# Patient Record
Sex: MALE | Race: WHITE | Employment: OTHER | URBAN - NONMETROPOLITAN AREA
[De-identification: names, ages, dates, MRNs, and addresses within clinical notes are randomized per-mention and may not be internally consistent; named-entity substitution may affect disease eponyms.]

---

## 2017-08-28 ENCOUNTER — ANTI-COAG VISIT (OUTPATIENT)
Dept: PHARMACY | Facility: CLINIC | Age: 72
End: 2017-08-28

## 2017-08-28 DIAGNOSIS — I48.19 PERSISTENT ATRIAL FIBRILLATION (HCC): ICD-10-CM

## 2017-08-28 LAB — INR BLD: 2.9

## 2017-09-06 ENCOUNTER — ANTI-COAG VISIT (OUTPATIENT)
Dept: PHARMACY | Facility: CLINIC | Age: 72
End: 2017-09-06

## 2017-09-06 DIAGNOSIS — I48.19 PERSISTENT ATRIAL FIBRILLATION (HCC): ICD-10-CM

## 2017-09-06 LAB — INR BLD: 1.8

## 2017-09-06 RX ORDER — GABAPENTIN 600 MG/1
600 TABLET ORAL 3 TIMES DAILY
COMMUNITY
End: 2020-02-17 | Stop reason: CLARIF

## 2017-09-06 RX ORDER — LISINOPRIL 10 MG/1
5 TABLET ORAL DAILY
COMMUNITY

## 2017-09-06 RX ORDER — OMEPRAZOLE 20 MG/1
20 CAPSULE, DELAYED RELEASE ORAL DAILY
COMMUNITY

## 2017-09-06 RX ORDER — ASPIRIN 81 MG/1
81 TABLET ORAL DAILY
COMMUNITY

## 2017-09-06 RX ORDER — OXYBUTYNIN CHLORIDE 5 MG/1
5 TABLET ORAL 2 TIMES DAILY
COMMUNITY

## 2017-09-06 RX ORDER — WARFARIN SODIUM 5 MG/1
5 TABLET ORAL DAILY
COMMUNITY
End: 2022-01-24

## 2017-09-06 RX ORDER — ATORVASTATIN CALCIUM 40 MG/1
40 TABLET, FILM COATED ORAL DAILY
COMMUNITY

## 2017-09-20 ENCOUNTER — ANTI-COAG VISIT (OUTPATIENT)
Dept: PHARMACY | Facility: CLINIC | Age: 72
End: 2017-09-20

## 2017-09-20 DIAGNOSIS — I48.19 PERSISTENT ATRIAL FIBRILLATION (HCC): ICD-10-CM

## 2017-09-20 LAB — INR BLD: 2.1

## 2017-10-11 ENCOUNTER — ANTI-COAG VISIT (OUTPATIENT)
Dept: PHARMACY | Facility: CLINIC | Age: 72
End: 2017-10-11

## 2017-10-11 DIAGNOSIS — I48.19 PERSISTENT ATRIAL FIBRILLATION (HCC): ICD-10-CM

## 2017-10-11 LAB — INR BLD: 1.9

## 2017-10-11 NOTE — PROGRESS NOTES
MrRegis Patel is here for management of anticoagulation for Afib. PMH also significant for HTN, HLD, DM, Hx DVT. He presents today w/out complaint. Pt verifies dosing regimen as listed above. Pt denies s/s bleeding/bruising/swelling/SOB. No BRBPR. No melena. No missed doses  No changes in RX/OTCs/Herbal medications. Reviewed dietary concerns  Address EToH and tobacco use. He has previously been on warfarin for DVTs in the past. Was on Eliquis for a while but developed a rash so was switched back to warfarin. No changes to medications or diet. As INR has been at low end of range, will make slight dose increase today. He will be going to [facility] for a week in Nov.    INR 1.9 is slightly below therapeutic range of 2-3   Recommend to increase dose to 7.5 mg daily except 5 mg Tu/Th/Sat  Patient has 5 mg tablets. Will continue to monitor and check INR in 3 weeks. Dosing reminder card given with phone number, appointment date and time.    Return to clinic: 11/1/17 @ 9:45 AM    Nanette Rome PharmD 9:38 AM 10/11/17

## 2017-11-01 ENCOUNTER — ANTI-COAG VISIT (OUTPATIENT)
Dept: PHARMACY | Facility: CLINIC | Age: 72
End: 2017-11-01

## 2017-11-01 LAB — INR BLD: 3.7

## 2017-11-22 ENCOUNTER — ANTI-COAG VISIT (OUTPATIENT)
Dept: PHARMACY | Facility: CLINIC | Age: 72
End: 2017-11-22

## 2017-11-22 LAB — INR BLD: 4.6

## 2017-11-22 NOTE — PROGRESS NOTES
MrRegis Burden is here for management of anticoagulation for Afib. PMH also significant for HTN, HLD, DM, Hx DVT. He presents today w/out complaint. Pt verifies dosing regimen as listed above. Pt denies s/s bleeding/bruising/swelling/SOB. No BRBPR. No melena. No missed doses  No changes in RX/OTCs/Herbal medications. Reviewed dietary concerns  Denies EToH and tobacco use. He has previously been on warfarin for DVTs in the past. Was on Eliquis for a while but developed a rash so was switched back to warfarin. INR is even higher than last visit when dose was decreased. Patient just got back from trip to [facility]. Patient is going out of town again to [place], then leaving for [place] next Wednesday. Pt states he will stop and have INR checked on their way when they leave for [place]. INR 4.6 is above therapeutic range of 2-3   Recommend to HOLD x 2 doses, then decrease to 5 mg daily. Patient has 5 mg tablets. Will continue to monitor and check INR in 7 days. Dosing reminder card given with phone number, appointment date and time.    Return to clinic: 11/29 @ 9:45am

## 2017-11-29 ENCOUNTER — ANTI-COAG VISIT (OUTPATIENT)
Dept: PHARMACY | Facility: CLINIC | Age: 72
End: 2017-11-29

## 2017-11-29 LAB — INR BLD: 1.7

## 2017-11-29 NOTE — PROGRESS NOTES
Mr. Ignacio Brito is here for management of anticoagulation for Afib. PMH also significant for HTN, HLD, DM, Hx DVT. He presents today w/out complaint. Pt verifies dosing regimen as listed above. Pt denies s/s bleeding/bruising/swelling/SOB. No BRBPR. No melena. No missed doses  No changes in RX/OTCs/Herbal medications. Reviewed dietary concerns  Denies EToH and tobacco use. He has previously been on warfarin for DVTs in the past. Was on Eliquis for a while but developed a rash so was switched back to warfarin. INR finally back down after large dose decrease. Will make small dose increase to hopefully have INR within range at next appt. INR 1.7 is slightly below therapeutic range of 2-3   Recommend to increase to 5 mg daily, except 7.5 mg on Wed. Patient has 5 mg tablets. Will continue to monitor and check INR in 2 weeks. Dosing reminder card given with phone number, appointment date and time.    Return to clinic: 12/13 @ 11:00am

## 2017-12-13 ENCOUNTER — ANTI-COAG VISIT (OUTPATIENT)
Dept: PHARMACY | Facility: CLINIC | Age: 72
End: 2017-12-13

## 2017-12-13 LAB — INR BLD: 2.4

## 2017-12-13 NOTE — PROGRESS NOTES
Mr. Leatha Silva is here for management of anticoagulation for Afib. PMH also significant for HTN, HLD, DM, Hx DVT. He presents today w/out complaint. Pt verifies dosing regimen as listed above. Pt denies s/s bleeding/bruising/swelling/SOB. No BRBPR. No melena. No missed doses  No changes in RX/OTCs/Herbal medications. Reviewed dietary concerns  Denies EToH and tobacco use. He has previously been on warfarin for DVTs in the past. Was on Eliquis for a while but developed a rash so was switched back to warfarin. INR 2.4 is within acceptbale therapeutic range of 2-3   Recommend to continue 5 mg daily, except 7.5 mg on Wed. Patient has 5 mg tablets. Will continue to monitor and check INR in 4 weeks. Dosing reminder card given with phone number, appointment date and time.    Return to clinic: 1/10 @ 11:00am

## 2018-01-08 ENCOUNTER — ANTI-COAG VISIT (OUTPATIENT)
Dept: PHARMACY | Facility: CLINIC | Age: 73
End: 2018-01-08

## 2018-01-08 LAB — INR BLD: 2

## 2018-01-08 NOTE — PROGRESS NOTES
MrRegis Martinez is here for management of anticoagulation for Afib. PMH also significant for HTN, HLD, DM, Hx DVT. He presents today w/out complaint. Pt verifies dosing regimen as listed above. Pt denies s/s bleeding/bruising/swelling/SOB. No BRBPR. No melena. No missed doses  No changes in RX/OTCs/Herbal medications. Reviewed dietary concerns  Denies EToH and tobacco use. He has previously been on warfarin for DVTs in the past. Was on Eliquis for a while but developed a rash so was switched back to warfarin. INR 2.0 is within acceptbale therapeutic range of 2-3   Recommend to continue 5 mg daily, except 7.5 mg on Wed. Patient has 5 mg tablets. Will continue to monitor and check INR in 4 weeks. Dosing reminder card given with phone number, appointment date and time.    Return to clinic: 2/5 @ 11:00am

## 2018-02-05 ENCOUNTER — ANTI-COAG VISIT (OUTPATIENT)
Dept: PHARMACY | Facility: CLINIC | Age: 73
End: 2018-02-05

## 2018-02-05 LAB — INR BLD: 2

## 2018-03-05 ENCOUNTER — ANTI-COAG VISIT (OUTPATIENT)
Dept: PHARMACY | Facility: CLINIC | Age: 73
End: 2018-03-05

## 2018-03-05 LAB — INR BLD: 1.8

## 2018-04-02 ENCOUNTER — ANTI-COAG VISIT (OUTPATIENT)
Dept: PHARMACY | Facility: CLINIC | Age: 73
End: 2018-04-02

## 2018-04-02 LAB — INR BLD: 1.7

## 2018-04-16 ENCOUNTER — ANTI-COAG VISIT (OUTPATIENT)
Dept: PHARMACY | Facility: CLINIC | Age: 73
End: 2018-04-16

## 2018-04-16 LAB — INR BLD: 1.9

## 2018-05-14 ENCOUNTER — ANTI-COAG VISIT (OUTPATIENT)
Dept: PHARMACY | Facility: CLINIC | Age: 73
End: 2018-05-14

## 2018-05-14 DIAGNOSIS — I48.19 PERSISTENT ATRIAL FIBRILLATION (HCC): ICD-10-CM

## 2018-05-14 LAB — INR BLD: 3

## 2018-06-11 ENCOUNTER — ANTI-COAG VISIT (OUTPATIENT)
Dept: PHARMACY | Facility: CLINIC | Age: 73
End: 2018-06-11

## 2018-06-11 DIAGNOSIS — I48.19 PERSISTENT ATRIAL FIBRILLATION (HCC): ICD-10-CM

## 2018-06-11 LAB — INR BLD: 3.8

## 2018-07-02 ENCOUNTER — ANTI-COAG VISIT (OUTPATIENT)
Dept: PHARMACY | Facility: CLINIC | Age: 73
End: 2018-07-02

## 2018-07-02 DIAGNOSIS — I48.19 PERSISTENT ATRIAL FIBRILLATION (HCC): ICD-10-CM

## 2018-07-02 LAB — INR BLD: 5.6

## 2018-07-02 NOTE — PROGRESS NOTES
Mr. Jenkins Gravely is here for management of anticoagulation for Afib. PMH also significant for HTN, HLD, DM, Hx DVT. He presents today w/out complaint. Pt verifies dosing regimen as listed above. Pt denies s/s bleeding/bruising/swelling/SOB. No BRBPR. No melena. No missed doses  No changes in RX/OTCs/Herbal medications. Reviewed dietary concerns  Denies EToH and tobacco use. He has previously been on warfarin for DVTs in the past. Was on Eliquis for a while but developed a rash so was switched back to warfarin. INR high today despite dose decrease at last visit. Unclear why INR so high, pt states no changes in diet/meds. INR 5.6 is well above acceptable therapeutic range of 2-3   Recommend to hold dose today and tomorrow, then reduce to 5 mg daily, except 2.5 mg on Sun. Patient has 5 mg tablets. Will continue to monitor and check INR in 1 week. Dosing reminder card given with phone number, appointment date and time.    Return to clinic: 7/9 @ 11:00am (usually arrives very early)    Agustín Delgado, Genevieve 10:33 AM 7/2/18

## 2018-07-09 ENCOUNTER — ANTI-COAG VISIT (OUTPATIENT)
Dept: PHARMACY | Facility: CLINIC | Age: 73
End: 2018-07-09

## 2018-07-09 DIAGNOSIS — I48.19 PERSISTENT ATRIAL FIBRILLATION (HCC): ICD-10-CM

## 2018-07-09 LAB — INR BLD: 1.7

## 2018-07-23 ENCOUNTER — ANTI-COAG VISIT (OUTPATIENT)
Dept: PHARMACY | Age: 73
End: 2018-07-23
Payer: MEDICARE

## 2018-07-23 DIAGNOSIS — I48.19 PERSISTENT ATRIAL FIBRILLATION (HCC): ICD-10-CM

## 2018-07-23 LAB — INR BLD: 3.1

## 2018-07-23 PROCEDURE — 99211 OFF/OP EST MAY X REQ PHY/QHP: CPT | Performed by: PHARMACIST

## 2018-07-23 PROCEDURE — 85610 PROTHROMBIN TIME: CPT | Performed by: PHARMACIST

## 2018-07-23 NOTE — PROGRESS NOTES
Mr. Fletcher Niece is here for management of anticoagulation for Afib. PMH also significant for HTN, HLD, DM, Hx DVT. He presents today w/out complaint. Pt verifies dosing regimen as listed above. Pt denies s/s bleeding/bruising/swelling/SOB. No BRBPR. No melena. No missed doses  No changes in RX/OTCs/Herbal medications. Reviewed dietary concerns  Denies EToH and tobacco use. He has previously been on warfarin for DVTs in the past. Was on Eliquis for a while but developed a rash so was switched back to warfarin. INR 3.1 is slightly above acceptable therapeutic range of 2-3   Recommend to continue dose of 5 mg daily. Patient has 5 mg tablets. Will continue to monitor and check INR in 4 weeks. Dosing reminder card given with phone number, appointment date and time.    Return to clinic: 8/20 @ 11:00am (usually arrives very early)    Giselle Martinez PharmD 9:57 AM 7/23/18

## 2018-08-20 ENCOUNTER — ANTI-COAG VISIT (OUTPATIENT)
Dept: PHARMACY | Age: 73
End: 2018-08-20
Payer: MEDICARE

## 2018-08-20 DIAGNOSIS — I48.19 PERSISTENT ATRIAL FIBRILLATION (HCC): ICD-10-CM

## 2018-08-20 LAB — INR BLD: 3.9

## 2018-08-20 PROCEDURE — 85610 PROTHROMBIN TIME: CPT | Performed by: PHARMACIST

## 2018-08-20 PROCEDURE — 99211 OFF/OP EST MAY X REQ PHY/QHP: CPT | Performed by: PHARMACIST

## 2018-09-10 ENCOUNTER — HOSPITAL ENCOUNTER (OUTPATIENT)
Age: 73
Discharge: HOME | End: 2018-09-10
Payer: MEDICARE

## 2018-09-24 ENCOUNTER — ANTI-COAG VISIT (OUTPATIENT)
Dept: PHARMACY | Age: 73
End: 2018-09-24
Payer: MEDICARE

## 2018-09-24 DIAGNOSIS — I48.19 PERSISTENT ATRIAL FIBRILLATION (HCC): ICD-10-CM

## 2018-09-24 LAB — INTERNATIONAL NORMALIZATION RATIO, POC: 2.5

## 2018-09-24 PROCEDURE — 99211 OFF/OP EST MAY X REQ PHY/QHP: CPT | Performed by: PHARMACIST

## 2018-09-24 PROCEDURE — 85610 PROTHROMBIN TIME: CPT | Performed by: PHARMACIST

## 2018-09-24 NOTE — PROGRESS NOTES
Mr. [facility] is here for management of anticoagulation for Afib. PMH also significant for HTN, HLD, DM, Hx DVT. He presents today w/out complaint. Pt verifies dosing regimen as listed above. Pt denies s/s bleeding/bruising/swelling/SOB. No BRBPR. No melena. No missed doses  No changes in RX/OTCs/Herbal medications. Reviewed dietary concerns  Denies EToH and tobacco use. He has previously been on warfarin for DVTs in the past. Was on Eliquis for a while but developed a rash so was switched back to warfarin. Currently taking amoxicillin for URI. INR 2.5 is withinacceptable therapeutic range of 2-3   Recommend to continue 5 mg daily except 2.5 mg on Sun/Wed. Patient has 5 mg tablets. Will continue to monitor and check INR in 4 weeks. Dosing reminder card given with phone number, appointment date and time.    Return to clinic: 10/22 @ 11 am (usually comes around 10)    Joao FlahertyD 10:12 AM 9/24/18

## 2018-10-22 ENCOUNTER — ANTI-COAG VISIT (OUTPATIENT)
Dept: PHARMACY | Age: 73
End: 2018-10-22
Payer: MEDICARE

## 2018-10-22 DIAGNOSIS — I48.19 PERSISTENT ATRIAL FIBRILLATION (HCC): ICD-10-CM

## 2018-10-22 LAB — INTERNATIONAL NORMALIZATION RATIO, POC: 3.3

## 2018-10-22 PROCEDURE — 85610 PROTHROMBIN TIME: CPT | Performed by: PHARMACIST

## 2018-10-22 PROCEDURE — 99211 OFF/OP EST MAY X REQ PHY/QHP: CPT | Performed by: PHARMACIST

## 2018-11-19 ENCOUNTER — ANTI-COAG VISIT (OUTPATIENT)
Dept: PHARMACY | Age: 73
End: 2018-11-19
Payer: MEDICARE

## 2018-11-19 DIAGNOSIS — I48.19 PERSISTENT ATRIAL FIBRILLATION (HCC): ICD-10-CM

## 2018-11-19 LAB — INTERNATIONAL NORMALIZATION RATIO, POC: 3.7

## 2018-11-19 PROCEDURE — 99211 OFF/OP EST MAY X REQ PHY/QHP: CPT | Performed by: PHARMACIST

## 2018-11-19 PROCEDURE — 85610 PROTHROMBIN TIME: CPT | Performed by: PHARMACIST

## 2018-12-17 ENCOUNTER — ANTI-COAG VISIT (OUTPATIENT)
Dept: PHARMACY | Age: 73
End: 2018-12-17
Payer: MEDICARE

## 2018-12-17 DIAGNOSIS — I48.19 PERSISTENT ATRIAL FIBRILLATION (HCC): ICD-10-CM

## 2018-12-17 LAB — INTERNATIONAL NORMALIZATION RATIO, POC: 1.9

## 2018-12-17 PROCEDURE — 99211 OFF/OP EST MAY X REQ PHY/QHP: CPT | Performed by: PHARMACIST

## 2018-12-17 PROCEDURE — 85610 PROTHROMBIN TIME: CPT | Performed by: PHARMACIST

## 2019-01-14 ENCOUNTER — ANTI-COAG VISIT (OUTPATIENT)
Dept: PHARMACY | Age: 74
End: 2019-01-14
Payer: MEDICARE

## 2019-01-14 LAB — INTERNATIONAL NORMALIZATION RATIO, POC: 2.5

## 2019-01-14 PROCEDURE — 99211 OFF/OP EST MAY X REQ PHY/QHP: CPT

## 2019-01-14 PROCEDURE — 85610 PROTHROMBIN TIME: CPT

## 2019-02-11 ENCOUNTER — ANTI-COAG VISIT (OUTPATIENT)
Dept: PHARMACY | Age: 74
End: 2019-02-11
Payer: MEDICARE

## 2019-02-11 DIAGNOSIS — I48.19 PERSISTENT ATRIAL FIBRILLATION (HCC): ICD-10-CM

## 2019-02-11 LAB — INTERNATIONAL NORMALIZATION RATIO, POC: 2.3

## 2019-02-11 PROCEDURE — 85610 PROTHROMBIN TIME: CPT | Performed by: PHARMACIST

## 2019-02-11 PROCEDURE — 99211 OFF/OP EST MAY X REQ PHY/QHP: CPT | Performed by: PHARMACIST

## 2019-03-11 ENCOUNTER — ANTI-COAG VISIT (OUTPATIENT)
Dept: PHARMACY | Age: 74
End: 2019-03-11
Payer: MEDICARE

## 2019-03-11 DIAGNOSIS — I48.19 PERSISTENT ATRIAL FIBRILLATION (HCC): ICD-10-CM

## 2019-03-11 LAB — INTERNATIONAL NORMALIZATION RATIO, POC: 2.1

## 2019-03-11 PROCEDURE — 99211 OFF/OP EST MAY X REQ PHY/QHP: CPT | Performed by: PHARMACIST

## 2019-03-11 PROCEDURE — 85610 PROTHROMBIN TIME: CPT | Performed by: PHARMACIST

## 2019-04-08 ENCOUNTER — ANTI-COAG VISIT (OUTPATIENT)
Dept: PHARMACY | Age: 74
End: 2019-04-08
Payer: MEDICARE

## 2019-04-08 DIAGNOSIS — I48.19 PERSISTENT ATRIAL FIBRILLATION (HCC): ICD-10-CM

## 2019-04-08 LAB — INTERNATIONAL NORMALIZATION RATIO, POC: 1.4

## 2019-04-08 PROCEDURE — 99211 OFF/OP EST MAY X REQ PHY/QHP: CPT | Performed by: PHARMACIST

## 2019-04-08 PROCEDURE — 85610 PROTHROMBIN TIME: CPT | Performed by: PHARMACIST

## 2019-05-06 ENCOUNTER — ANTI-COAG VISIT (OUTPATIENT)
Dept: PHARMACY | Age: 74
End: 2019-05-06
Payer: MEDICARE

## 2019-05-06 DIAGNOSIS — I48.19 PERSISTENT ATRIAL FIBRILLATION (HCC): ICD-10-CM

## 2019-05-06 LAB — INTERNATIONAL NORMALIZATION RATIO, POC: 2

## 2019-05-06 PROCEDURE — 99211 OFF/OP EST MAY X REQ PHY/QHP: CPT | Performed by: PHARMACIST

## 2019-05-06 PROCEDURE — 85610 PROTHROMBIN TIME: CPT | Performed by: PHARMACIST

## 2019-05-06 NOTE — PROGRESS NOTES
Mr. Eliodoro Felty is here for management of anticoagulation for Afib. PMH also significant for HTN, HLD, DM, Hx DVT. He presents today w/out complaint. Pt verifies dosing regimen as listed above. Pt denies s/s bleeding/bruising/swelling/SOB. No BRBPR. No melena. No changes in RX/OTCs/Herbal medications. Reviewed dietary concerns  Denies EToH and tobacco use. He has previously been on warfarin for DVTs in the past. Was on Eliquis for a while but developed a rash so was switched back to warfarin. INR improved today after dose increase last visit. INR 2.0 is within aceptable therapeutic range of 2-3. Recommend to continue dose to 5 mg daily except 2.5 mg on Sun/Wed  Patient has 5 mg tablets. Will continue to monitor and check INR in 4 weeks. Dosing reminder card given with phone number, appointment date and time.    Return to clinic: 6/3 @ 11 am (usually comes around 10)    Don Salazar PharmD 9:41 AM 5/6/19

## 2019-06-03 ENCOUNTER — ANTI-COAG VISIT (OUTPATIENT)
Dept: PHARMACY | Age: 74
End: 2019-06-03
Payer: MEDICARE

## 2019-06-03 DIAGNOSIS — I48.19 PERSISTENT ATRIAL FIBRILLATION (HCC): ICD-10-CM

## 2019-06-03 LAB — INTERNATIONAL NORMALIZATION RATIO, POC: 2.4

## 2019-06-03 PROCEDURE — 85610 PROTHROMBIN TIME: CPT | Performed by: PHARMACIST

## 2019-06-03 PROCEDURE — 99211 OFF/OP EST MAY X REQ PHY/QHP: CPT | Performed by: PHARMACIST

## 2019-06-03 NOTE — PROGRESS NOTES
Mr. Marco Antonio Perkins is here for management of anticoagulation for Afib. PMH also significant for HTN, HLD, DM, Hx DVT. He presents today w/out complaint. Pt verifies dosing regimen as listed above. Pt denies s/s bleeding/bruising/swelling/SOB. No BRBPR. No melena. No changes in RX/OTCs/Herbal medications. Reviewed dietary concerns  Denies EToH and tobacco use. He has previously been on warfarin for DVTs in the past. Was on Eliquis for a while but developed a rash so was switched back to warfarin. INR 2.4 is within aceptable therapeutic range of 2-3. Recommend to continue dose to 5 mg daily except 2.5 mg on Sun/Wed  Patient has 5 mg tablets. Will continue to monitor and check INR in 4 weeks. Dosing reminder card given with phone number, appointment date and time.    Return to clinic: 7/1 @ 11 am (usually comes around 10)    Brayden Potts PharmD 9:36 AM 6/3/19

## 2019-07-01 ENCOUNTER — ANTI-COAG VISIT (OUTPATIENT)
Dept: PHARMACY | Age: 74
End: 2019-07-01
Payer: MEDICARE

## 2019-07-01 DIAGNOSIS — I48.19 PERSISTENT ATRIAL FIBRILLATION (HCC): ICD-10-CM

## 2019-07-01 LAB — INTERNATIONAL NORMALIZATION RATIO, POC: 1.3

## 2019-07-01 PROCEDURE — 99211 OFF/OP EST MAY X REQ PHY/QHP: CPT | Performed by: PHARMACIST

## 2019-07-01 PROCEDURE — 85610 PROTHROMBIN TIME: CPT | Performed by: PHARMACIST

## 2019-07-01 NOTE — PROGRESS NOTES
Mr. Montrell Harp is here for management of anticoagulation for Afib. PMH also significant for HTN, HLD, DM, Hx DVT. He presents today w/out complaint. Pt verifies dosing regimen as listed above. Pt denies s/s bleeding/bruising/swelling/SOB. No BRBPR. No melena. No changes in RX/OTCs/Herbal medications. Reviewed dietary concerns  Denies EToH and tobacco use. He has previously been on warfarin for DVTs in the past. Was on Eliquis for a while but developed a rash so was switched back to warfarin. Having a colonoscopy on 7/31. Will hold warfarin for 4 days prior. INR quite low today. Denies any missed doses or other changes. Unclear why INR is so low today. Will boost today then resume current dose as he has otherwise been pretty stable. INR 1.3 is below aceptable therapeutic range of 2-3. Recommend to take 10 mg today, then continue dose to 5 mg daily except 2.5 mg on Sun/Wed  Patient has 5 mg tablets. Will continue to monitor and check INR in 3 weeks. Dosing reminder card given with phone number, appointment date and time.    Return to clinic: 7/22 @ 11 am (usually comes around 10)    Bob Qiu PharmD 10:15 AM 7/1/19

## 2019-07-22 ENCOUNTER — ANTI-COAG VISIT (OUTPATIENT)
Dept: PHARMACY | Age: 74
End: 2019-07-22
Payer: MEDICARE

## 2019-07-22 DIAGNOSIS — I48.19 PERSISTENT ATRIAL FIBRILLATION (HCC): ICD-10-CM

## 2019-07-22 LAB — INTERNATIONAL NORMALIZATION RATIO, POC: 1.3

## 2019-07-22 PROCEDURE — 85610 PROTHROMBIN TIME: CPT | Performed by: FAMILY MEDICINE

## 2019-07-22 PROCEDURE — 99211 OFF/OP EST MAY X REQ PHY/QHP: CPT | Performed by: FAMILY MEDICINE

## 2019-07-22 NOTE — PROGRESS NOTES
MrRegis Hooper Both is here for management of anticoagulation for Afib. PMH also significant for HTN, HLD, DM, Hx DVT. He presents today w/out complaint. Pt verifies dosing regimen as listed above. Pt denies s/s bleeding/bruising/swelling/SOB. No BRBPR. No melena. No changes in RX/OTCs/Herbal medications. Reviewed dietary concerns  Denies EToH and tobacco use. He has previously been on warfarin for DVTs in the past. Was on Eliquis for a while but developed a rash so was switched back to warfarin. Having a colonoscopy on 7/31. Will hold warfarin for 4 days prior. INR is low again today. Patient denies any changes or missed doses. Will increase his dose. Will bring patient back 1 week after his procedure. INR 1.3 is below aceptable therapeutic range of 2-3. Recommend to take 7.5 mg today, then increase dose to 5 mg daily except 2.5 mg on Sun. Patient has 5 mg tablets. Will continue to monitor and check INR in 2 weeks. Dosing reminder card given with phone number, appointment date and time.    Return to clinic: 8/7 @ 11 am (usually comes around 10)    Fanny Briscoe, PharmD 7/22/2019 10:04 AM

## 2019-08-07 ENCOUNTER — ANTI-COAG VISIT (OUTPATIENT)
Dept: PHARMACY | Age: 74
End: 2019-08-07
Payer: MEDICARE

## 2019-08-07 DIAGNOSIS — I48.19 PERSISTENT ATRIAL FIBRILLATION (HCC): ICD-10-CM

## 2019-08-07 LAB — INTERNATIONAL NORMALIZATION RATIO, POC: 1.3

## 2019-08-07 PROCEDURE — 85610 PROTHROMBIN TIME: CPT | Performed by: PHARMACIST

## 2019-08-07 PROCEDURE — 99211 OFF/OP EST MAY X REQ PHY/QHP: CPT | Performed by: PHARMACIST

## 2019-08-07 NOTE — PROGRESS NOTES
Mr. Stanley Arrington is here for management of anticoagulation for Afib. PMH also significant for HTN, HLD, DM, Hx DVT. He presents today w/out complaint. Pt verifies dosing regimen as listed above. Pt denies s/s bleeding/bruising/swelling/SOB. No BRBPR. No melena. No changes in RX/OTCs/Herbal medications. Reviewed dietary concerns  Denies EToH and tobacco use. He has previously been on warfarin for DVTs in the past. Was on Eliquis for a while but developed a rash so was switched back to warfarin. Had a colonoscopy on 7/31. Restarted warfarin that evening. INR is low again today. Patient denies any changes or missed doses. Will increase dose again    INR 1.3 is below aceptable therapeutic range of 2-3. Recommend to increase dose to 5 mg daily except 7.5 mg on Wed. Patient has 5 mg tablets. Will continue to monitor and check INR in 2 weeks. Dosing reminder card given with phone number, appointment date and time.    Return to clinic: 8/19 @ 11 am (usually comes around 10)    Mary Fontenot PharmD 9:52 AM 8/7/19

## 2019-08-19 ENCOUNTER — ANTI-COAG VISIT (OUTPATIENT)
Dept: PHARMACY | Age: 74
End: 2019-08-19
Payer: MEDICARE

## 2019-08-19 DIAGNOSIS — I48.19 PERSISTENT ATRIAL FIBRILLATION (HCC): ICD-10-CM

## 2019-08-19 LAB — INTERNATIONAL NORMALIZATION RATIO, POC: 2.1

## 2019-08-19 PROCEDURE — 99211 OFF/OP EST MAY X REQ PHY/QHP: CPT | Performed by: PHARMACIST

## 2019-08-19 PROCEDURE — 85610 PROTHROMBIN TIME: CPT | Performed by: PHARMACIST

## 2019-09-18 ENCOUNTER — ANTI-COAG VISIT (OUTPATIENT)
Dept: PHARMACY | Age: 74
End: 2019-09-18
Payer: MEDICARE

## 2019-09-18 DIAGNOSIS — I48.19 PERSISTENT ATRIAL FIBRILLATION (HCC): ICD-10-CM

## 2019-09-18 LAB — INTERNATIONAL NORMALIZATION RATIO, POC: 1.9

## 2019-09-18 PROCEDURE — 85610 PROTHROMBIN TIME: CPT | Performed by: PHARMACIST

## 2019-09-18 PROCEDURE — 99211 OFF/OP EST MAY X REQ PHY/QHP: CPT | Performed by: PHARMACIST

## 2019-09-18 NOTE — PROGRESS NOTES
MrRegis Go is here for management of anticoagulation for Afib. PMH also significant for HTN, HLD, DM, Hx DVT. He presents today w/out complaint. Pt verifies dosing regimen as listed above. Pt denies s/s bleeding/bruising/swelling/SOB. No BRBPR. No melena. No changes in RX/OTCs/Herbal medications. Reviewed dietary concerns  Denies EToH and tobacco use. He has previously been on warfarin for DVTs in the past. Was on Eliquis for a while but developed a rash so was switched back to warfarin. INR back in range today after multiple dose increases. INR 1.9 is within aceptable therapeutic range of 2-3. Recommend to continue dose of 5 mg daily except 7.5 mg on Wed. Patient has 5 mg tablets. Will continue to monitor and check INR in 4 weeks. Dosing reminder card given with phone number, appointment date and time. Return to clinic: 10/14 @ 11:00 am    Irma Rapp PharmD candidate    I have seen the patient and reviewed the progress note written by the PharmD Candidate. I agree with this assessment and plan.    Otoniel Pryor PharmD 9/18/2019 9:56 AM

## 2019-10-14 ENCOUNTER — ANTI-COAG VISIT (OUTPATIENT)
Dept: PHARMACY | Age: 74
End: 2019-10-14
Payer: MEDICARE

## 2019-10-14 DIAGNOSIS — I48.19 PERSISTENT ATRIAL FIBRILLATION (HCC): ICD-10-CM

## 2019-10-14 LAB — INTERNATIONAL NORMALIZATION RATIO, POC: 3.6

## 2019-10-14 PROCEDURE — 99211 OFF/OP EST MAY X REQ PHY/QHP: CPT | Performed by: PHARMACIST

## 2019-10-14 PROCEDURE — 85610 PROTHROMBIN TIME: CPT | Performed by: PHARMACIST

## 2019-11-11 ENCOUNTER — ANTI-COAG VISIT (OUTPATIENT)
Dept: PHARMACY | Age: 74
End: 2019-11-11
Payer: MEDICARE

## 2019-11-11 DIAGNOSIS — I48.19 PERSISTENT ATRIAL FIBRILLATION (HCC): ICD-10-CM

## 2019-11-11 LAB — INTERNATIONAL NORMALIZATION RATIO, POC: 2.2

## 2019-11-11 PROCEDURE — 99211 OFF/OP EST MAY X REQ PHY/QHP: CPT

## 2019-11-11 PROCEDURE — 85610 PROTHROMBIN TIME: CPT

## 2020-01-06 ENCOUNTER — HOSPITAL ENCOUNTER (OUTPATIENT)
Dept: GENERAL RADIOLOGY | Age: 75
Discharge: HOME | End: 2020-01-06
Payer: MEDICARE

## 2020-01-06 ENCOUNTER — HOSPITAL ENCOUNTER (OUTPATIENT)
Age: 75
Discharge: HOME | End: 2020-01-06
Payer: MEDICARE

## 2020-01-06 PROCEDURE — 73502 X-RAY EXAM HIP UNI 2-3 VIEWS: CPT

## 2020-01-27 ENCOUNTER — OFFICE VISIT (OUTPATIENT)
Dept: ORTHOPEDIC SURGERY | Age: 75
End: 2020-01-27
Payer: MEDICARE

## 2020-01-27 VITALS — WEIGHT: 200 LBS | HEIGHT: 69 IN | BODY MASS INDEX: 29.62 KG/M2

## 2020-01-27 PROCEDURE — G8484 FLU IMMUNIZE NO ADMIN: HCPCS | Performed by: ORTHOPAEDIC SURGERY

## 2020-01-27 PROCEDURE — 99202 OFFICE O/P NEW SF 15 MIN: CPT | Performed by: ORTHOPAEDIC SURGERY

## 2020-01-27 PROCEDURE — G8427 DOCREV CUR MEDS BY ELIG CLIN: HCPCS | Performed by: ORTHOPAEDIC SURGERY

## 2020-01-27 PROCEDURE — G8417 CALC BMI ABV UP PARAM F/U: HCPCS | Performed by: ORTHOPAEDIC SURGERY

## 2020-01-27 RX ORDER — METHYLPREDNISOLONE 4 MG/1
TABLET ORAL
Qty: 1 KIT | Refills: 0 | Status: SHIPPED | OUTPATIENT
Start: 2020-01-27 | End: 2020-02-02

## 2020-01-27 NOTE — PROGRESS NOTES
connections:     Talks on phone: Not on file     Gets together: Not on file     Attends Alevism service: Not on file     Active member of club or organization: Not on file     Attends meetings of clubs or organizations: Not on file     Relationship status: Not on file    Intimate partner violence:     Fear of current or ex partner: Not on file     Emotionally abused: Not on file     Physically abused: Not on file     Forced sexual activity: Not on file   Other Topics Concern    Not on file   Social History Narrative    Not on file       FAMILY HISTORY    No family history on file. PHYSICAL EXAM    Ht 5' 9\" (1.753 m)   Wt 200 lb (90.7 kg)   BMI 29.53 kg/m² [email or web address]   General:  Patient is alert and orientation to person place and time is age-appropriate. Gait:  Patient walks around the room and in the hallway with a normal gait and no limp. Balance and coordination are age-appropriate. Extremities: On examination he has significant flexion in the standing position at the hips, some hyperlordosis of the spine, pain with lumbar extension and rotation. He does have some tenderness at the trochanter although this extends down the iliotibial band to the lateral calf. He is able to dorsiflex strength perhaps 4 out of 5. Negative Angi. Is a very mild pain with internal and external rotation of the hip. Skin:  Normal on back and bilateral upper and lower extremities. Spine:  No deformity. Neurological:  Normal motor and sensory exam in both upper and lower extremities. Vascular exam:  Normal in both upper and lower extremities. IMAGING STUDIES    X-rays available for review during the office include films of the hip. These demonstrate moderate osteoarthritis of the hip, some preserved joint space but significant marginal osteophyte development. More notable on the AP pelvis view is the severe lower lumbar degenerative deformity.        Office Procedures:      IMPRESSION    Clinical

## 2020-01-29 ENCOUNTER — HOSPITAL ENCOUNTER (OUTPATIENT)
Dept: PHYSICAL THERAPY | Age: 75
Setting detail: THERAPIES SERIES
Discharge: HOME | End: 2020-01-29
Payer: MEDICARE

## 2020-01-29 PROCEDURE — 97140 MANUAL THERAPY 1/> REGIONS: CPT

## 2020-01-29 PROCEDURE — 97110 THERAPEUTIC EXERCISES: CPT

## 2020-01-29 PROCEDURE — 97161 PT EVAL LOW COMPLEX 20 MIN: CPT

## 2020-01-29 NOTE — PLAN OF CARE
hasn't had any relief yet. The pain goes down the side of the R thigh and is in the front of the calf and foot.     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    Living Status/Prior Level of Function: Independent with ADLs and IADLs,     OBJECTIVE:     Standing Exam Normal Abnormal N/A Comments   Toe walk    A  Unable    Heel Walk  A  Unable    Side bending  A  R inc leg pain   Pelvic Height       Fwd Bend- (aberrant juttering or innominate mvmt)  A  Inc leg pain   Extension  A  Inc back pain   Trendelenburg  A  R trunk lean during stance   Kemps/Quadrant       Stork       SLS/SLS w rotation                     Seated Exam Normal Abnormal N/A Comments   Pelvic Height       Seated Rotation       Seated flexion       B hip IR  A                   Supine Exam Normal Abnormal N/A Comments   Hip flexion       Abduction       ER  A     IR       MILAN/Kumar                ROM LEFT RIGHT Comments   Lumbar Flex 80  Inc leg pain   Lumbar Ext 20  Inc back pain   Side Bend      Rotation                    ROM LEFT RIGHT Comments   Hip Flexion      Hip Abd      Hip ER      Hip IR      Hip Extension      Knee Ext      Knee Flex      Hamstring Flex      Piriformis                    Strength LEFT RIGHT Comments   Multifidus      Transverse Ab 2/5     Hip Flexors      Hip Abductors 3/5 2-/5    Hip Extensors                     Myotomes Normal Abnormal Comments   Hip flexion (L1-L2) N     Knee extension (L2-L4)  A    Dorsiflexion (L4-L5)  A    Great conditions   []Disc pathology   []Congenital spine pathologies   []Prior surgical intervention  []Osteoporosis (M81.8)  []Osteopenia (M85.8)   Endocrine conditions   []Hypothyroid (E03.9)  []Hyperthyroid Gastrointestinal conditions   []Constipation (K51.53)   Metabolic conditions   []Morbid obesity (E66.01)  [x]Diabetes type 1(E10.65) or 2 (E11.65)   []Neuropathy (G60.9)     Pulmonary conditions   []Asthma (J45)  []Coughing   []COPD (J44.9)   Psychological Disorders  []Anxiety (F41.9)  []Depression (F32.9)   []Other:   [x]Other:  H/o heart problems, stroke/TIA, cancer        Barriers to/and or personal factors that will affect rehab potential:              []Age  []Sex              []Motivation/Lack of Motivation                        [x]Co-Morbidities              []Cognitive Function, education/learning barriers              []Environmental, home barriers              []profession/work barriers  []past PT/medical experience  []other:  Justification:  Patient medically complex; not a good surgical candidate    Falls Risk Assessment (30 days):   [x] Falls Risk assessed and no intervention required.   [] Falls Risk assessed and Patient requires intervention due to being higher risk   TUG score (>12s at risk):     [] Falls education provided, including       G-Codes:       ASSESSMENT:   Functional Impairments:     []Noted lumbar/proximal hip hypomobility   []Noted lumbosacral and/or generalized hypermobility   [x]Decreased Lumbosacral/hip/LE functional ROM   [x]Decreased core/proximal hip strength and neuromuscular control    [x]Decreased LE functional strength    [x]Abnormal reflexes/sensation/myotomal/dermatomal deficits  [x]Reduced balance/proprioceptive control    []other:      Functional Activity Limitations (from functional questionnaire and intake)   [x]Reduced ability to tolerate prolonged functional positions   [x]Reduced ability or difficulty with changes of positions or transfers between positions   [x]Reduced ability to maintain good posture and demonstrate good body mechanics with sitting, bending, and lifting   [x]Reduced ability to sleep   [x] Reduced ability or tolerance with driving and/or computer work   [x]Reduced ability to perform lifting, reaching, carrying tasks   [x]Reduced ability to squat   [x]Reduced ability to forward bend   [x]Reduced ability to ambulate prolonged functional periods/distances/surfaces   [x]Reduced ability to ascend/descend stairs   []other:       Participation Restrictions   [x]Reduced participation in self care activities   [x]Reduced participation in home management activities   []Reduced participation in work activities   [x]Reduced participation in social activities. [x]Reduced participation in sport/recreational activities. Classification:   [x]Signs/symptoms consistent with Lumbar instability/stabilization subgroup. []Signs/symptoms consistent with Lumbar mobilization/manipulation subgroup, myotomes and dermatomes intact. Meets manipulation criteria. []Signs/symptoms consistent with Lumbar direction specific/centralization subgroup   []Signs/symptoms consistent with Lumbar traction subgroup     []Signs/symptoms consistent with lumbar facet dysfunction   []Signs/symptoms consistent with lumbar stenosis type dysfunction   [x]Signs/symptoms consistent with nerve root involvement including myotome & dermatome dysfunction   []Signs/symptoms consistent with post-surgical status including: decreased ROM, strength and function.    []signs/symptoms consistent with pathology which may benefit from Dry needling     []other:      Prognosis/Rehab Potential:      []Excellent   [x]Good    []Fair   []Poor    Tolerance of evaluation/treatment:    []Excellent   [x]Good    []Fair   []Poor    Physical Therapy Evaluation Complexity Justification   [x] A history of present problem with:  [] no personal factors and/or comorbidities that impact the plan of care;  []1-2 [] Not Met: [] Adjusted   2. Patient will have a decrease in pain to facilitate improvement in movement, function, and ADLs as indicated by Functional Deficits. [] Progressing: [] Met: [] Not Met: [] Adjusted     Long Term Goals: To be achieved in: 12 weeks  1. Disability index score of 20% or less for the LEFS/Oswestry to assist with reaching prior level of function. [] Progressing: [] Met: [] Not Met: [] Adjusted   2. Patient will demonstrate increased AROM to equal the opposite side bilaterally to allow for proper joint functioning as indicated by patients Functional Deficits. [] Progressing: [] Met: [] Not Met: [] Adjusted   3. Patient will demonstrate an increase in strength to 5/5 to allow for proper functional mobility as indicated by patients Functional Deficits. [] Progressing: [] Met: [] Not Met: [] Adjusted   4. Patient will return to all transfers, work activities, and functional activities without increased symptoms or restriction. [] Progressing: [] Met: [] Not Met: [] Adjusted   5. Patient will have 0/10 pain with ADL's.  [] Progressing: [] Met: [] Not Met: [] Adjusted   6. Patient stated goal: Patient wants to be able to walk x 20 minutes without AD, pain, or restriction.   [] Progressing: [] Met: [] Not Met: [] Adjusted     Electronically signed by:  Solange Melendrez PT

## 2020-01-29 NOTE — FLOWSHEET NOTE
[facility], [address] [place] [address] Marisa Finch, [facility] hospital    Physical Therapy Treatment Note/ Progress Report:     Date:  2020    Patient Name:  Joyce Hernandez    :  1945  MRN: [id number]  Restrictions/Precautions:    Medical/Treatment Diagnosis Information:  · Diagnosis: Low back pain with R radiculopathy  · Treatment Diagnosis: M54.16, C66.5  Insurance/Certification information:  PT Insurance Information: Medicare  Physician Information:  Referring Practitioner: Indu Culver  Has the plan of care been signed (Y/N):        []  Yes  [x]  No     Date of Patient follow up with Physician: 20 with Duane Laboy    Is this a Progress Report:     []  Yes  [x]  No      If Yes:  Date Range for reporting period:  Initial Eval: 2020  Beginnin2020 --- Endin20    Progress report will be due (10 Rx or 30 days whichever is less): 3/56/52     Recertification will be due (POC Duration  / 90 days whichever is less): 20     Visit # Insurance Allowable Auth Required   1 Med Nec []  Yes []  No      Functional Scale:  Mod Oswestry: 46% (Score: 23/80)   Date assessed: 2020      Latex Allergy:  [x]NO      []YES  Preferred Language for Healthcare:   [x]English       []other:    Pain level:  9/10     SUBJECTIVE:  See eval    OBJECTIVE: See eval   Observation:    Test measurements:      RESTRICTIONS/PRECAUTIONS: h/o diabetes, cancer, heart problems    Exercises/Interventions:   Therapeutic Ex (94515)  Therapeutic Activity (89244)  NMR re-education (70479) Sets/Reps Notes/CUES   Bike          PPT attempt Pain in all supine positions   Supine knee to chest attempt HEP        Seated flexion 10x10\"    Seated HS stretch 3x30\" ea    Seated rotation 20x                                                                                    Manual Intervention (51597)     Sidelying opening/light joint mobility 10' With some soft tissue mobilization Patient Education         Therapeutic Exercise and NMR EXR  [x] (96660) Provided verbal/tactile cueing for activities related to strengthening, flexibility, endurance, ROM for improvements in LE, proximal hip, and core control with self care, mobility, lifting, ambulation. [x] (45452) Provided verbal/tactile cueing for activities related to improving balance, coordination, kinesthetic sense, posture, motor skill, proprioception to assist with LE, proximal hip, and core control in self-care, mobility, lifting, ambulation and eccentric single leg control. NMR and Therapeutic Activities:    [x] (41606 or 82351) Provided verbal/tactile cueing for activities related to improving balance, coordination, kinesthetic sense, posture, motor skill, proprioception and motor activation to allow for proper function of core, proximal hip and LE with self-care and ADLs and functional mobility.    [x] (87627) Gait Re-education- Provided training and instruction to the patient for proper LE, core and proximal hip recruitment and positioning and eccentric body weight control with ambulation re-education including up and down stairs     Home Exercise Program:    [x] (52881) Reviewed/Progressed HEP activities related to strengthening, flexibility, endurance, ROM of core, proximal hip and LE for functional self-care, mobility, lifting and ambulation/stair navigation   [x] (99829) Reviewed/Progressed HEP activities related to improving balance, coordination, kinesthetic sense, posture, motor skill, proprioception of core, proximal hip and LE for self-care, mobility, lifting, and ambulation/stair navigation      Manual Treatments:  PROM / STM / Oscillations-Mobs:  G-I, II, III, IV (PA's, Inf., Post.)  [x] (92222) Provided manual therapy to mobilize LE, proximal hip and/or LS spine soft tissue/joints for the purpose of modulating pain, promoting relaxation, increasing ROM, reducing/eliminating soft tissue swelling/inflammation/restriction, improving soft tissue extensibility and allowing for proper ROM for normal function with self-care, mobility, lifting and ambulation. Modalities:  Moist heat x 10 min    Charges:  Timed Code Treatment Minutes: 30   Total Treatment Minutes:  60   BWC:  TE TIME:  NMR TIME:  MANUAL TIME:  UNTIMED MINUTES:   n/a  n/a  n/a  n/a      [x] EVAL (LOW) 36076 (typically 20 minutes face-to-face)  [] EVAL (MOD) 61661 (typically 30 minutes face-to-face)  [] EVAL (HIGH) 21066 (typically 45 minutes face-to-face)  [] RE-EVAL     [x] HU(21037) x  1   [] IONTO  [] NMR (10218) x     [] VASO  [x] Manual (17208) x 1    [] Other:  [] TA x      [] Mech Traction (56711)  [] ES(attended) (67638)      [] ES (un) (09201):    ASSESSMENT:  See eval    GOALS:   Short Term Goals: To be achieved in: 2 weeks  1. Independent in HEP and progression per patient tolerance, in order to prevent re-injury. []? Progressing: []? Met: []? Not Met: []? Adjusted       2. Patient will have a decrease in pain to facilitate improvement in movement, function, and ADLs as indicated by Functional Deficits. []? Progressing: []? Met: []? Not Met: []? Adjusted          Long Term Goals: To be achieved in: 12 weeks  1. Disability index score of 20% or less for the LEFS/Oswestry to assist with reaching prior level of function. []? Progressing: []? Met: []? Not Met: []? Adjusted      2. Patient will demonstrate increased AROM to equal the opposite side bilaterally to allow for proper joint functioning as indicated by patients Functional Deficits. []? Progressing: []? Met: []? Not Met: []? Adjusted       3. Patient will demonstrate an increase in strength to 5/5 to allow for proper functional mobility as indicated by patients Functional Deficits. []? Progressing: []? Met: []? Not Met: []? Adjusted       4. Patient will return to all transfers, work activities, and functional activities without increased symptoms or restriction.

## 2020-02-03 ENCOUNTER — HOSPITAL ENCOUNTER (OUTPATIENT)
Dept: PHYSICAL THERAPY | Age: 75
Setting detail: THERAPIES SERIES
Discharge: HOME | End: 2020-02-03
Payer: MEDICARE

## 2020-02-03 PROCEDURE — 97110 THERAPEUTIC EXERCISES: CPT

## 2020-02-03 PROCEDURE — 97140 MANUAL THERAPY 1/> REGIONS: CPT

## 2020-02-03 NOTE — FLOWSHEET NOTE
[facility], [address] [facility] Marisa Finch, [address]    Physical Therapy Treatment Note/ Progress Report:     Date:  2/3/2020    Patient Name:  Toya Tom    :  1945  MRN: [id number]  Restrictions/Precautions:    Medical/Treatment Diagnosis Information:  · Diagnosis: Low back pain with R radiculopathy  · Treatment Diagnosis: M54.16, C42.0  Insurance/Certification information:  PT Insurance Information: Medicare  Physician Information:  Referring Practitioner: Artem Vasquez  Has the plan of care been signed (Y/N):        []  Yes  [x]  No     Date of Patient follow up with Physician: 20 with Steve Alfredo    Is this a Progress Report:     []  Yes  [x]  No      If Yes:  Date Range for reporting period:  Initial Eval: 2020  Beginnin2020 --- Endin20    Progress report will be due (10 Rx or 30 days whichever is less):      Recertification will be due (POC Duration  / 90 days whichever is less): 20     Visit # Insurance Allowable Auth Required   3 Med Nec []  Yes []  No      Functional Scale: Mod Oswestry: 46% (Score: 23/80)   Date assessed: 2020      Latex Allergy:  [x]NO      []YES  Preferred Language for Healthcare:   [x]English       []other:    Pain level:  9/10     SUBJECTIVE:  Patient reports he had increased pain after last session, but then it returned to the usual level of pain the next day. He reports he continues to have pain down the RLE. Today it is in the calf, but it continues to go into his R foot sometimes.       OBJECTIVE: See eval   Observation:    Test measurements:      RESTRICTIONS/PRECAUTIONS: h/o diabetes, cancer, heart problems    Exercises/Interventions:   Therapeutic Ex (76849)  Therapeutic Activity (84998)  NMR re-education (10775) Sets/Reps Notes/CUES   Bike          PPT [id number] on wedge and pillow    Supine / sidelying piriformis stretch 3x30\"       Supine bent knee drop out with ab set     Lumbar rotations     Supine knee to chest         Seated flexion 10x10\" Patient reports relief with this exercise   Seated HS stretch 3x30\" ea    Seated rotation 20x                                                                                    Manual Intervention (16940)     Sidelying opening/light joint mobility 10'With some soft tissue mobilization   Sidelying knee to chest with manual assist 20x    HS Stretch     Piriformis stretch  5'    Lumbar traction                               Patient Education         Therapeutic Exercise and NMR EXR  [x] (20009) Provided verbal/tactile cueing for activities related to strengthening, flexibility, endurance, ROM for improvements in LE, proximal hip, and core control with self care, mobility, lifting, ambulation. [x] (47089) Provided verbal/tactile cueing for activities related to improving balance, coordination, kinesthetic sense, posture, motor skill, proprioception to assist with LE, proximal hip, and core control in self-care, mobility, lifting, ambulation and eccentric single leg control. NMR and Therapeutic Activities:    [x] (26572 or 74943) Provided verbal/tactile cueing for activities related to improving balance, coordination, kinesthetic sense, posture, motor skill, proprioception and motor activation to allow for proper function of core, proximal hip and LE with self-care and ADLs and functional mobility.    [x] (32194) Gait Re-education- Provided training and instruction to the patient for proper LE, core and proximal hip recruitment and positioning and eccentric body weight control with ambulation re-education including up and down stairs     Home Exercise Program:    [x] (72903) Reviewed/Progressed HEP activities related to strengthening, flexibility, endurance, ROM of core, proximal hip and LE for functional self-care, mobility, lifting and ambulation/stair navigation   [x] (70971) Reviewed/Progressed HEP activities related to improving balance, coordination, kinesthetic sense, posture, motor skill, proprioception of core, proximal hip and LE for self-care, mobility, lifting, and ambulation/stair navigation      Manual Treatments:  PROM / STM / Oscillations-Mobs:  G-I, II, III, IV (PA's, Inf., Post.)  [x] (53218) Provided manual therapy to mobilize LE, proximal hip and/or LS spine soft tissue/joints for the purpose of modulating pain, promoting relaxation, increasing ROM, reducing/eliminating soft tissue swelling/inflammation/restriction, improving soft tissue extensibility and allowing for proper ROM for normal function with self-care, mobility, lifting and ambulation. Modalities:      Charges:  Timed Code Treatment Minutes: 40   Total Treatment Minutes:  40   BWC:  TE TIME:  NMR TIME:  MANUAL TIME:  UNTIMED MINUTES:   n/a  n/a  n/a  n/a      [] EVAL (LOW) 93568 (typically 20 minutes face-to-face)  [] EVAL (MOD) 08731 (typically 30 minutes face-to-face)  [] EVAL (HIGH) 64761 (typically 45 minutes face-to-face)  [] RE-EVAL     [x] QJ(49782) x  2   [] IONTO  [] NMR (99378) x     [] VASO  [x] Manual (86144) x 1    [] Other:  [] TA x      [] Mech Traction (45455)  [] ES(attended) (85764)      [] ES (un) (03631):    ASSESSMENT:  See eval    GOALS:   Short Term Goals: To be achieved in: 2 weeks  1. Independent in HEP and progression per patient tolerance, in order to prevent re-injury. []? Progressing: [x]? Met: []? Not Met: []? Adjusted       2. Patient will have a decrease in pain to facilitate improvement in movement, function, and ADLs as indicated by Functional Deficits. [x]? Progressing: []? Met: []? Not Met: []? Adjusted          Long Term Goals: To be achieved in: 12 weeks  1. Disability index score of 20% or less for the LEFS/Oswestry to assist with reaching prior level of function. [x]? Progressing: []? Met: []? Not Met: []? Adjusted      2.  Patient will demonstrate increased AROM to equal the opposite side bilaterally to allow for proper joint functioning as indicated by patients Functional Deficits. [x]? Progressing: []? Met: []? Not Met: []? Adjusted       3. Patient will demonstrate an increase in strength to 5/5 to allow for proper functional mobility as indicated by patients Functional Deficits. [x]? Progressing: []? Met: []? Not Met: []? Adjusted       4. Patient will return to all transfers, work activities, and functional activities without increased symptoms or restriction. [x]? Progressing: []? Met: []? Not Met: []? Adjusted       5. Patient will have 0/10 pain with ADL's. [x]? Progressing: []? Met: []? Not Met: []? Adjusted       6. Patient stated goal: Patient wants to be able to walk x 20 minutes without AD, pain, or restriction. [x]? Progressing: []? Met: []? Not Met: []? Adjusted       (Cut Sydni Amin from [facility])    Overall Progression Towards Functional goals/ Treatment Progress Update:  [x] Patient is progressing as expected towards functional goals listed. [] Progression is slowed due to complexities/Impairments listed. [] Progression has been slowed due to co-morbidities.   [x] Plan just implemented, too soon to assess goals progression <30days   [] Goals require adjustment due to lack of progress  [] Patient is not progressing as expected and requires additional follow up with physician  [] Other    Prognosis for POC: [x] Good [] Fair  [] Poor    Patient requires continued skilled intervention: [x] Yes  [] No    Treatment/Activity Tolerance:  [x] Patient able to complete treatment  [] Patient limited by fatigue  [] Patient limited by pain    [] Patient limited by other medical complications  [] Other:     Return to Play: (if applicable)   []  Stage 1: Intro to Strength   []  Stage 2: Return to Run and Strength   []  Stage 3: Return to Jump and Strength   []  Stage 4: Dynamic Strength and Agility   []  Stage 5: Sport Specific Training     []  Ready to Return to Play, Meets All Above Stages   []  Not Ready for Return to Sports   Comments:

## 2020-02-05 ENCOUNTER — HOSPITAL ENCOUNTER (OUTPATIENT)
Dept: PHYSICAL THERAPY | Age: 75
Setting detail: THERAPIES SERIES
Discharge: HOME | End: 2020-02-05
Payer: MEDICARE

## 2020-02-05 PROCEDURE — 97110 THERAPEUTIC EXERCISES: CPT

## 2020-02-05 PROCEDURE — 97140 MANUAL THERAPY 1/> REGIONS: CPT

## 2020-02-05 NOTE — FLOWSHEET NOTE
[facility], [address] [place] [address] Marisa Finch, [address]    Physical Therapy Treatment Note/ Progress Report:     Date:  2020    Patient Name:  Rosa Horn    :  1945  MRN: [id number]  Restrictions/Precautions:    Medical/Treatment Diagnosis Information:  · Diagnosis: Low back pain with R radiculopathy  · Treatment Diagnosis: M54.16, B39.0  Insurance/Certification information:  PT Insurance Information: Medicare  Physician Information:  Referring Practitioner: Renee Donovan  Has the plan of care been signed (Y/N):        []  Yes  [x]  No     Date of Patient follow up with Physician: 20 with Melinda Rushing    Is this a Progress Report:     []  Yes  [x]  No      If Yes:  Date Range for reporting period:  Initial Eval: 2020  Beginnin2020 --- Endin20    Progress report will be due (10 Rx or 30 days whichever is less):      Recertification will be due (POC Duration  / 90 days whichever is less): 20     Visit # Insurance Allowable Auth Required   4 Med Nec []  Yes []  No      Functional Scale: Mod Oswestry: 46% (Score: 23/80)   Date assessed: 2020      Latex Allergy:  [x]NO      []YES  Preferred Language for Healthcare:   [x]English       []other:    Pain level:  9/10     SUBJECTIVE:  Patient reports he had increased pain again after last session. His symptoms in his R foot and leg haven't really changed. He reports he has been having some pain in his chest/ribs when he first wakes up in the morning. Blood pressure was slightly higher than normal, per patient. Asked patient to call PCP and report BP/symptoms to see if he wants to see patient.       OBJECTIVE:    Observation:    Test measurements: BP: 154/92, pulse 85 bpm     RESTRICTIONS/PRECAUTIONS: h/o diabetes, cancer, heart problems    Exercises/Interventions:   Therapeutic Ex (58038)  Therapeutic Activity (22955)  NMR re-education (81060) Sets/Reps Notes/CUES   Bike 3' Started to have some increased L reaching prior level of function. [x]? Progressing: []? Met: []? Not Met: []? Adjusted      2. Patient will demonstrate increased AROM to equal the opposite side bilaterally to allow for proper joint functioning as indicated by patients Functional Deficits. [x]? Progressing: []? Met: []? Not Met: []? Adjusted       3. Patient will demonstrate an increase in strength to 5/5 to allow for proper functional mobility as indicated by patients Functional Deficits. [x]? Progressing: []? Met: []? Not Met: []? Adjusted       4. Patient will return to all transfers, work activities, and functional activities without increased symptoms or restriction. [x]? Progressing: []? Met: []? Not Met: []? Adjusted       5. Patient will have 0/10 pain with ADL's. [x]? Progressing: []? Met: []? Not Met: []? Adjusted       6. Patient stated goal: Patient wants to be able to walk x 20 minutes without AD, pain, or restriction. [x]? Progressing: []? Met: []? Not Met: []? Adjusted       (Katt Jerry Fischer from [facility])    Overall Progression Towards Functional goals/ Treatment Progress Update:  [x] Patient is progressing as expected towards functional goals listed. [] Progression is slowed due to complexities/Impairments listed. [] Progression has been slowed due to co-morbidities.   [x] Plan just implemented, too soon to assess goals progression <30days   [] Goals require adjustment due to lack of progress  [] Patient is not progressing as expected and requires additional follow up with physician  [] Other    Prognosis for POC: [x] Good [] Fair  [] Poor    Patient requires continued skilled intervention: [x] Yes  [] No    Treatment/Activity Tolerance:  [x] Patient able to complete treatment  [] Patient limited by fatigue  [] Patient limited by pain    [] Patient limited by other medical complications  [] Other:     Return to Play: (if applicable)   []  Stage 1: Intro to Strength   []  Stage 2: Return to Run and Strength   []  Stage 3: Return to Jump and Strength   []  Stage 4: Dynamic Strength and Agility   []  Stage 5: Sport Specific Training     []  Ready to Return to Play, Meets All Above Stages   []  Not Ready for Return to Sports   Comments:                         PLAN: See eval  [x] Continue per plan of care [] Alter current plan (see comments above)  [] Plan of care initiated [] Hold pending MD visit [] Discharge    Electronically signed by:  Keysha Espinal PT    Note: If patient does not return for scheduled/ recommended follow up visits, this note will serve as a discharge from care along with most recent update on progress.

## 2020-02-10 ENCOUNTER — APPOINTMENT (OUTPATIENT)
Dept: PHYSICAL THERAPY | Age: 75
End: 2020-02-10
Payer: MEDICARE

## 2020-02-13 ENCOUNTER — OFFICE VISIT (OUTPATIENT)
Dept: ORTHOPEDIC SURGERY | Age: 75
End: 2020-02-13
Payer: MEDICARE

## 2020-02-13 VITALS — WEIGHT: 199.96 LBS | HEIGHT: 69 IN | BODY MASS INDEX: 29.62 KG/M2

## 2020-02-13 PROCEDURE — 1036F TOBACCO NON-USER: CPT | Performed by: PHYSICAL MEDICINE & REHABILITATION

## 2020-02-13 PROCEDURE — 4040F PNEUMOC VAC/ADMIN/RCVD: CPT | Performed by: PHYSICAL MEDICINE & REHABILITATION

## 2020-02-13 PROCEDURE — 3017F COLORECTAL CA SCREEN DOC REV: CPT | Performed by: PHYSICAL MEDICINE & REHABILITATION

## 2020-02-13 PROCEDURE — 99203 OFFICE O/P NEW LOW 30 MIN: CPT | Performed by: PHYSICAL MEDICINE & REHABILITATION

## 2020-02-13 PROCEDURE — G8484 FLU IMMUNIZE NO ADMIN: HCPCS | Performed by: PHYSICAL MEDICINE & REHABILITATION

## 2020-02-13 PROCEDURE — G8427 DOCREV CUR MEDS BY ELIG CLIN: HCPCS | Performed by: PHYSICAL MEDICINE & REHABILITATION

## 2020-02-13 PROCEDURE — 1123F ACP DISCUSS/DSCN MKR DOCD: CPT | Performed by: PHYSICAL MEDICINE & REHABILITATION

## 2020-02-13 PROCEDURE — G8417 CALC BMI ABV UP PARAM F/U: HCPCS | Performed by: PHYSICAL MEDICINE & REHABILITATION

## 2020-02-13 RX ORDER — GABAPENTIN 300 MG/1
CAPSULE ORAL
Qty: 45 CAPSULE | Refills: 2 | Status: SHIPPED | OUTPATIENT
Start: 2020-02-13 | End: 2020-02-17 | Stop reason: SDUPTHER

## 2020-02-13 NOTE — PROGRESS NOTES
New Patient: SPINE    CHIEF COMPLAINT:    Chief Complaint   Patient presents with    New Patient     LOW BACK        HISTORY OF PRESENT ILLNESS:                The patient is a 75 y.o. male I last saw in 2013. He is the  of Rhett Austin who is my longtime patient. He reports 1 month history of radiating pain right buttock posterior lateral thigh and into the calf. Reports symptoms are constant. Worse with standing. He took a Medrol Dosepak without improvements. He is on Coumadin chronic. I had seen him in 2013 for left sciatica with an epidural    Past Medical History:   Diagnosis Date    [facility] [address]     TESTICLE    Diabetes mellitus ([facility] [address]     Hx of blood clots     Hyperlipidemia     Hypertension           Pain Assessment  Location of Pain: Back  Severity of Pain: 10  Quality of Pain: Aching, Dull, Sharp  Duration of Pain: Persistent  Frequency of Pain: Constant  Aggravating Factors: Standing, Walking, Stairs, Bending, Stretching, Straightening, Exercise, Kneeling, Squatting  Limiting Behavior: Yes  Result of Injury: No  Work-Related Injury: No  Are there other pain locations you wish to document?: No    The pain assessment was noted & reviewed in the medical record today.      Current/Past Treatment:   · Physical Therapy: Ongoing with aggravation  · Chiropractic:     · Injection:   Left lumbar epidural with me 2013  Medications:            NSAIDS:             Muscle relaxer:              Steriods:   MDP            Neuropathic medications:   2017 gabapentin            Opioids:            Other:   · Surgery/Consult:    Work Status/Functionality:     Past Medical History: Medical history form was reviewed today & scanned into the media tab  Past Medical History:   Diagnosis Date    Cancer ([facility] [address]     TESTICLE    Diabetes mellitus ([facility] [address]     Hx of blood clots     Hyperlipidemia     Hypertension       Past Surgical History:     Past Surgical History:   Procedure Laterality Date    CARDIAC motion is full. There is no gross instability. There are no rashes, ulcerations or lesions. Strength and tone are normal.    Diagnostic Testing:      I reviewed recent hip x-ray shows shows mild hip arthritis and advanced discogenic spondylosis of the low back    2013 lumbar MRI:    IMPRESSION-        Multilevel degenerative disc changes with multilevel facet   arthropathy of the lower lumbar spine. Most significant findings   include a new small right neural foraminal disc herniation at   L4-L5 which along with facet arthropathy leads to moderate to   severe right neural foraminal stenosis and possible right L4 nerve   impingement. There is moderate bilateral neural foraminal stenosis   at L5-S1 with possible nerve impingement on either side   particularly on the right however this appearance is not   significantly changed from December 2011. A small central disc   herniation is noted at L1-L2 which may be slightly more prominent   since the prior exam but causes no cord impingement or critical   spinal stenosis.          Impression:    Lumbar spondylosis with new right sciatica  Radiographic right L4 foraminal stenosis        Plan:     Updated lumbar MRI  Gabapentin 300 mg 1-2 nightly  Stop physical therapy    PAUL Suggs

## 2020-02-17 ENCOUNTER — TELEPHONE (OUTPATIENT)
Dept: ORTHOPEDIC SURGERY | Age: 75
End: 2020-02-17

## 2020-02-17 RX ORDER — GABAPENTIN 300 MG/1
CAPSULE ORAL
Qty: 45 CAPSULE | Refills: 2 | Status: SHIPPED | OUTPATIENT
Start: 2020-02-17 | End: 2020-03-16

## 2020-02-21 ENCOUNTER — HOSPITAL ENCOUNTER (OUTPATIENT)
Dept: MRI IMAGING | Age: 75
Discharge: HOME | End: 2020-02-21
Payer: MEDICARE

## 2020-02-21 PROCEDURE — 72148 MRI LUMBAR SPINE W/O DYE: CPT

## 2020-03-19 ENCOUNTER — OFFICE VISIT (OUTPATIENT)
Dept: ORTHOPEDIC SURGERY | Age: 75
End: 2020-03-19
Payer: MEDICARE

## 2020-03-19 VITALS — BODY MASS INDEX: 29.62 KG/M2 | HEIGHT: 69 IN | WEIGHT: 199.96 LBS

## 2020-03-19 PROCEDURE — 1123F ACP DISCUSS/DSCN MKR DOCD: CPT | Performed by: PHYSICIAN ASSISTANT

## 2020-03-19 PROCEDURE — G8427 DOCREV CUR MEDS BY ELIG CLIN: HCPCS | Performed by: PHYSICIAN ASSISTANT

## 2020-03-19 PROCEDURE — 3017F COLORECTAL CA SCREEN DOC REV: CPT | Performed by: PHYSICIAN ASSISTANT

## 2020-03-19 PROCEDURE — G8484 FLU IMMUNIZE NO ADMIN: HCPCS | Performed by: PHYSICIAN ASSISTANT

## 2020-03-19 PROCEDURE — 1036F TOBACCO NON-USER: CPT | Performed by: PHYSICIAN ASSISTANT

## 2020-03-19 PROCEDURE — G8417 CALC BMI ABV UP PARAM F/U: HCPCS | Performed by: PHYSICIAN ASSISTANT

## 2020-03-19 PROCEDURE — 4040F PNEUMOC VAC/ADMIN/RCVD: CPT | Performed by: PHYSICIAN ASSISTANT

## 2020-03-19 PROCEDURE — 99214 OFFICE O/P EST MOD 30 MIN: CPT | Performed by: PHYSICIAN ASSISTANT

## 2020-03-19 RX ORDER — HYDROCODONE BITARTRATE AND ACETAMINOPHEN 5; 325 MG/1; MG/1
1 TABLET ORAL 2 TIMES DAILY PRN
Qty: 14 TABLET | Refills: 0 | Status: SHIPPED | OUTPATIENT
Start: 2020-03-19 | End: 2020-03-26

## 2020-03-19 NOTE — PROGRESS NOTES
ENDARTERECTOMY Right 2001    CORONARY ANGIOPLASTY WITH STENT PLACEMENT      TESTICLE REMOVAL      [place]     Current Medications:     Current Outpatient Medications:     gabapentin (NEURONTIN) 300 MG capsule, 1-2 po qhs, Disp: 45 capsule, Rfl: 2    metFORMIN (GLUCOPHAGE) 850 MG tablet, Take 850 mg by mouth 2 times daily (with meals), Disp: , Rfl:     oxybutynin (DITROPAN) 5 MG tablet, Take 5 mg by mouth 2 times daily, Disp: , Rfl:     lisinopril (PRINIVIL;ZESTRIL) 10 MG tablet, Take 5 mg by mouth daily, Disp: , Rfl:     omeprazole (PRILOSEC) 20 MG delayed release capsule, Take 20 mg by mouth Daily, Disp: , Rfl:     vitamin D 1000 units CAPS, Take 2,000 Units by mouth 2 times daily, Disp: , Rfl:     atorvastatin (LIPITOR) 40 MG tablet, Take 40 mg by mouth daily, Disp: , Rfl:     warfarin (COUMADIN) 5 MG tablet, Take 5 mg by mouth daily, Disp: , Rfl:     aspirin 81 MG EC tablet, Take 81 mg by mouth daily, Disp: , Rfl:     verapamil (CALAN-SR) 240 MG CR tablet, Take 120 mg by mouth nightly , Disp: , Rfl:     glimepiride (AMARYL) 4 MG tablet, Take 6 mg by mouth every morning (before breakfast) , Disp: , Rfl:     SENNOSIDES PO, Take 17.2 mg by mouth 2 times daily , Disp: , Rfl:   Allergies:  Codeine and Erythromycin  Social History:    reports that he has never smoked. He has never used smokeless tobacco. He reports that he does not drink alcohol. Family History:   No family history on file.     REVIEW OF SYSTEMS: Full ROS noted & scanned   CONSTITUTIONAL: Denies unexplained weight loss, fevers, chills or fatigue  NEUROLOGICAL: Denies unsteady gait or progressive weakness  MUSCULOSKELETAL: Denies joint swelling or redness  PSYCHOLOGICAL: Denies anxiety, depression   SKIN: Denies skin changes, delayed healing, rash, itching   HEMATOLOGIC: Denies easy bleeding or bruising  ENDOCRINE: Denies excessive thirst, urination, heat/cold  RESPIRATORY: Denies current dyspnea, cough  GI: Denies nausea, vomiting, diarrhea There are no rashes, ulcerations or lesions. Strength and tone are normal.    Diagnostic Testing:      I reviewed recent hip x-ray shows shows mild hip arthritis and advanced discogenic spondylosis of the low back    2013 lumbar MRI:    IMPRESSION-        Multilevel degenerative disc changes with multilevel facet   arthropathy of the lower lumbar spine. Most significant findings   include a new small right neural foraminal disc herniation at   L4-L5 which along with facet arthropathy leads to moderate to   severe right neural foraminal stenosis and possible right L4 nerve   impingement. There is moderate bilateral neural foraminal stenosis   at L5-S1 with possible nerve impingement on either side   particularly on the right however this appearance is not   significantly changed from December 2011. A small central disc   herniation is noted at L1-L2 which may be slightly more prominent   since the prior exam but causes no cord impingement or critical   spinal stenosis.          Impression:    Lumbar spondylosis with new right sciatica  Radiographic right L4 foraminal stenosis        Plan:     Updated lumbar MRI  Gabapentin 300 mg 1-2 nightly  Stop physical therapy    PAUL Wilkins

## 2020-03-19 NOTE — LETTER
[facility]    Please Schedule the following with: Dr. Betsy Vail    Date:  3/19/20     Patient: Swati Moe     YOB: 1945    Patient Home Phone: [phone] (home)    Diagnosis: Right lumbar radiculitis M54.16, central and lateral recess stenosis L4-5 M48.062    []LT     [x]RT     []TAIWO     []Midline    Levels: L4-5 #1    []Cervical RONNIE 99249, 91320  []L-MBB 57641, 68471  []SI Joint 53415   []C-FACET 10827, 41247, 49313  []L-FACET Q4284957, 17110  []Interlaminar RONNIE 96194     []HIP 69416    []C-MBB  [x]Transforaminal RONNIE 88196  []Neurotomy 38416, 06375, 81642    Attending Physician: Frandy Alfredo    Injection Schedule for: At: [facility]    First Insurance:MEDICARE                                    Pre-cert #:NO [address]:                 Pre-cert #:    Comments:    SEDATION:       [] IV           [] ORAL    [x] Blood Thinner: Coumadin                [x]Diabetic           []Antibiotic:               []Glaucoma:    [] Pacemaker/defib       [] Current Open Wounds, Lacerations or Sores     Allergies:    Allergies   Allergen Reactions    Codeine Rash    Erythromycin Rash       Past Medical History:   Diagnosis Date    Cancer ([facility] [address]     TESTICLE    Diabetes mellitus ([facility] [address]     Hx of blood clots     Hyperlipidemia     Hypertension         Current Outpatient Medications   Medication Sig Dispense Refill    gabapentin (NEURONTIN) 300 MG capsule 1-2 po qhs 45 capsule 2    metFORMIN (GLUCOPHAGE) 850 MG tablet Take 850 mg by mouth 2 times daily (with meals)      oxybutynin (DITROPAN) 5 MG tablet Take 5 mg by mouth 2 times daily      lisinopril (PRINIVIL;ZESTRIL) 10 MG tablet Take 5 mg by mouth daily      omeprazole (PRILOSEC) 20 MG delayed release capsule Take 20 mg by mouth Daily      vitamin D 1000 units CAPS Take 2,000 Units by mouth 2 times daily  atorvastatin (LIPITOR) 40 MG tablet Take 40 mg by mouth daily      warfarin (COUMADIN) 5 MG tablet Take 5 mg by mouth daily      aspirin 81 MG EC tablet Take 81 mg by mouth daily      verapamil (CALAN-SR) 240 MG CR tablet Take 120 mg by mouth nightly       glimepiride (AMARYL) 4 MG tablet Take 6 mg by mouth every morning (before breakfast)       SENNOSIDES PO Take 17.2 mg by mouth 2 times daily        No current facility-administered medications for this visit. [address]                     ______________________________________________________________________      [address] [place]      1. Admit to preop. 2. Start IV 1000 ml LR at [facility] or _____ml/hr for planned conscious sedation     3. May inject 1 % Lidocaine 0.1 ml Intradermal to numb IV site     4. Protime/INR if patient is on Coumadin     5. Urine Pregnancy Test (females only) - 12 -50 years     6. Accu Check Glucose if diabetic. Notify physician if <80 or >250.      7. Sedate all neurotomies          ______________________________________________________________________    POST-OPERATIVE ORDERS - DR. PEARCE      1. Admit to Post Op Phase 2     2. Implement Standards of Care for Phase 2 Post Op     3. Check Site - May discharge when site is free of bleeding     4. Discharge to home after meets Phase 2 criteria     5. Discharge cervical patients after 30 minutes and when meets Phase 2 criteria. 6. Give discharge instruction sheet     7. For Diabetic patient, if blood sugar less than 80 in preop,          Recheck blood sugar in Post Op. 8. Discontinue IV     9.  For Nausea may give Zofran 4 MG IV/IM/ODT           ______________________________________________________________________    Kassidy See     1945        3/19/20 1:53 PM

## 2020-03-19 NOTE — PROGRESS NOTES
Follow up: SPINE    CHIEF COMPLAINT:    Chief Complaint   Patient presents with    Back Pain     MRI result       HISTORY OF PRESENT ILLNESS:                The patient is a 75 y.o. male I last saw in 2013,  of our patient Leno Cruz here to review lumbar MRI. He reports a 3 to 4-month history of aching low back pain radiating into the right buttock posterior lateral thigh/calf to the foot. Symptoms are increased with any walking or standing. And improves with sitting and resting. Conservative care includes PT, MDP, gabapentin. No improvement at this time. Reports subjective right leg weakness but no progressive weakness. No recent b/b issues. He is on Coumadin chronic. I had seen him in 2013 for left sciatica with an epidural    Past Medical History:   Diagnosis Date    Cancer ([facility] [address]     TESTICLE    Diabetes mellitus ([facility] [address]     Hx of blood clots     Hyperlipidemia     Hypertension           Pain Assessment  Location of Pain: Back  Severity of Pain: 8  Quality of Pain: Aching  Duration of Pain: Persistent  Frequency of Pain: Constant  Aggravating Factors: Standing, Walking, Other (Comment)  Limiting Behavior: Yes  Relieving Factors: Rest  Result of Injury: No  Work-Related Injury: No  Are there other pain locations you wish to document?: No    The pain assessment was noted & reviewed in the medical record today.      Current/Past Treatment:   · Physical Therapy: Ongoing with aggravation  · Chiropractic:     · Injection:   Left lumbar epidural with me 2013  Medications:            NSAIDS:             Muscle relaxer:              Steriods:   MDP            Neuropathic medications:   Gabapentin nightly            Opioids:            Other:   · Surgery/Consult: No    Past Medical History: Medical history form was reviewed today & scanned into the media tab  Past Medical History:   Diagnosis Date    Cancer ([address]     TESTICLE    Diabetes mellitus ([address]     Hx of blood clots     Hyperlipidemia     Hypertension       Past Surgical History:     Past Surgical History:   Procedure Laterality Date    CARDIAC SURGERY      OPEN HEART    CAROTID ENDARTERECTOMY Right 2001    CORONARY ANGIOPLASTY WITH STENT PLACEMENT      TESTICLE REMOVAL      [place]     Current Medications:     Current Outpatient Medications:     gabapentin (NEURONTIN) 300 MG capsule, 1-2 po qhs, Disp: 45 capsule, Rfl: 2    metFORMIN (GLUCOPHAGE) 850 MG tablet, Take 850 mg by mouth 2 times daily (with meals), Disp: , Rfl:     oxybutynin (DITROPAN) 5 MG tablet, Take 5 mg by mouth 2 times daily, Disp: , Rfl:     lisinopril (PRINIVIL;ZESTRIL) 10 MG tablet, Take 5 mg by mouth daily, Disp: , Rfl:     omeprazole (PRILOSEC) 20 MG delayed release capsule, Take 20 mg by mouth Daily, Disp: , Rfl:     vitamin D 1000 units CAPS, Take 2,000 Units by mouth 2 times daily, Disp: , Rfl:     atorvastatin (LIPITOR) 40 MG tablet, Take 40 mg by mouth daily, Disp: , Rfl:     warfarin (COUMADIN) 5 MG tablet, Take 5 mg by mouth daily, Disp: , Rfl:     aspirin 81 MG EC tablet, Take 81 mg by mouth daily, Disp: , Rfl:     verapamil (CALAN-SR) 240 MG CR tablet, Take 120 mg by mouth nightly , Disp: , Rfl:     glimepiride (AMARYL) 4 MG tablet, Take 6 mg by mouth every morning (before breakfast) , Disp: , Rfl:     SENNOSIDES PO, Take 17.2 mg by mouth 2 times daily , Disp: , Rfl:   Allergies:  Codeine and Erythromycin  Social History:    reports that he has never smoked. He has never used smokeless tobacco. He reports that he does not drink alcohol. Family History:   No family history on file. REVIEW OF SYSTEMS: Full ROS noted & scanned   CONSTITUTIONAL: Denies unexplained weight loss, fevers, chills or fatigue  NEUROLOGICAL: Denies unsteady gait or progressive weakness      PHYSICAL EXAM:    Vitals: Height 5' 9.02\" (1.753 m), weight 199 lb 15.3 oz (90.7 kg).     GENERAL EXAM:  · General Apparence: Patient is adequately groomed with no evidence of

## 2020-05-05 ENCOUNTER — TELEPHONE (OUTPATIENT)
Dept: ORTHOPEDIC SURGERY | Age: 75
End: 2020-05-05

## 2020-08-05 ENCOUNTER — ANTI-COAG VISIT (OUTPATIENT)
Dept: PHARMACY | Age: 75
End: 2020-08-05
Payer: MEDICARE

## 2020-08-05 LAB — INTERNATIONAL NORMALIZATION RATIO, POC: 5.3

## 2020-08-05 PROCEDURE — 85610 PROTHROMBIN TIME: CPT | Performed by: PHARMACIST

## 2020-08-05 PROCEDURE — 99211 OFF/OP EST MAY X REQ PHY/QHP: CPT | Performed by: PHARMACIST

## 2020-08-05 NOTE — PROGRESS NOTES
Mr. Jt Lopez is here for management of anticoagulation for Afib. PMH also significant for HTN, HLD, DM, Hx DVT. He presents today w/out complaint. Pt verifies dosing regimen as listed above. Pt denies s/s bleeding/bruising/swelling/SOB. No BRBPR. No melena. No changes in RX/OTCs/Herbal medications. Reviewed dietary concerns  Denies EToH and tobacco use. He has previously been on warfarin for DVTs in the past. Was on Eliquis for a while but developed a rash so was switched back to warfarin. Pt coming back to clinic as [employer] closing? Pt states current dose is 5 mg daily except 2.5 mg Q Tue/Thu.  Pt states last INR was about 2 weeks ago and he thinks it was in range. Pt states no significant changes. Unclear why INR is elevated today. Pt seen in office  97.7 deg F    INR 5.3  is above aceptable therapeutic range of 2-3. Recommend to hold dose today and tomorrow, then reduce dose to dose of 5 mg daily except 2.5 mg Q Tue/Thu/Sat  Patient has 5 mg tablets. Recheck INR in 1 weeks  Dosing reminder card given with phone number, appointment date and time. Return to clinic:     Marcela Madison, PharmD 1:23 PM EDT 8/5/20    Addendum 8/6  Received call from [facility]. They requested results sent from each visit.  Fax# [phone]

## 2020-08-17 ENCOUNTER — ANTI-COAG VISIT (OUTPATIENT)
Dept: PHARMACY | Age: 75
End: 2020-08-17
Payer: MEDICARE

## 2020-08-17 LAB — INTERNATIONAL NORMALIZATION RATIO, POC: 4.4

## 2020-08-17 PROCEDURE — 99211 OFF/OP EST MAY X REQ PHY/QHP: CPT | Performed by: PHARMACIST

## 2020-08-17 PROCEDURE — 85610 PROTHROMBIN TIME: CPT | Performed by: PHARMACIST

## 2020-08-17 NOTE — PROGRESS NOTES
Mr. Lawyer Ortiz is here for management of anticoagulation for Afib. PMH also significant for HTN, HLD, DM, Hx DVT. He presents today w/out complaint. Pt verifies dosing regimen as listed above. Pt denies s/s bleeding/bruising/swelling/SOB. No BRBPR. No melena. No changes in RX/OTCs/Herbal medications. Reviewed dietary concerns  Denies EToH and tobacco use. He has previously been on warfarin for DVTs in the past. Was on Eliquis for a while but developed a rash so was switched back to warfarin. Pt coming back to clinic as [employer] closing? Pt states no significant changes. INR still elevated but improved from last visit after holding 2 doses and decreasing weekly dose. Will hold one dose today then reduce weekly dose again. Pt states no other changes. Pt seen in office  97.5 deg F    INR 4.4 is above aceptable therapeutic range of 2-3. Recommend to hold dose today and tomorrow, then reduce dose to 2.5 mg daily except 5 mg Q MWF  Patient has 5 mg tablets. Recheck INR in 2 weeks  Dosing reminder card given with phone number, appointment date and time. Return to clinic: 8/31 @ [address] [facility], [place] 10:40 AM EDT 8/17/20    Received call from [facility]. They requested results sent from each visit.  Fax# [phone]

## 2020-08-19 ENCOUNTER — HOSPITAL ENCOUNTER (OUTPATIENT)
Dept: VASCULAR LAB | Age: 75
Discharge: HOME | End: 2020-08-19
Payer: MEDICARE

## 2020-08-19 PROCEDURE — 93922 UPR/L XTREMITY ART 2 LEVELS: CPT

## 2020-08-31 ENCOUNTER — ANTI-COAG VISIT (OUTPATIENT)
Dept: PHARMACY | Age: 75
End: 2020-08-31
Payer: MEDICARE

## 2020-08-31 LAB — INR BLD: 1.5

## 2020-08-31 PROCEDURE — 85610 PROTHROMBIN TIME: CPT

## 2020-08-31 PROCEDURE — 99211 OFF/OP EST MAY X REQ PHY/QHP: CPT

## 2020-08-31 NOTE — PROGRESS NOTES
Mr. Francine Borrero is here for management of anticoagulation for Afib. PMH also significant for HTN, HLD, DM, Hx DVT. He presents today w/out complaint. Pt verifies dosing regimen as listed above. Pt denies s/sx bleeding/bruising/swelling/SOB. No changes in RX/OTCs/Herbal medications. No dietary concerns  Denies EToH and tobacco use. He has previously been on warfarin for DVTs in the past. Was on Eliquis for a while but developed a rash so was switched back to warfarin. Pt coming back to clinic as [employer] closing? Pt states no significant changes. Pt states no other changes. Pt seen in office  97.5 deg F    Pt was in the hospital at [facility] for an INR of 10. He states he was given VitK. Restarted Warfarin 3mg daily a week ago on discharge. INR 1.5 is below aceptable therapeutic range of 2-3, d/t being held and Vit K   Recommend to take 4.5mg Q Mon and 3mg daily all other days. Patient has 3 mg tablets now (still some 5mg )  Recheck INR in 2 weeks  Dosing reminder card given with phone number, appointment date and time. Return to clinic: 9/9 @ 9777    Received call from [facility]. They requested results sent from each visit.  Fax# [phone]

## 2020-09-03 ENCOUNTER — HOSPITAL ENCOUNTER (OUTPATIENT)
Age: 75
Setting detail: OUTPATIENT SURGERY
Discharge: HOME | End: 2020-09-03
Attending: PAIN MEDICINE | Admitting: PAIN MEDICINE
Payer: MEDICARE

## 2020-09-03 VITALS
DIASTOLIC BLOOD PRESSURE: 54 MMHG | SYSTOLIC BLOOD PRESSURE: 149 MMHG | RESPIRATION RATE: 16 BRPM | WEIGHT: 190 LBS | HEART RATE: 54 BPM | BODY MASS INDEX: 28.14 KG/M2 | TEMPERATURE: 97.5 F | HEIGHT: 69 IN | OXYGEN SATURATION: 96 %

## 2020-09-03 LAB
GLUCOSE BLD-MCNC: 120 MG/DL (ref 70–99)
GLUCOSE BLD-MCNC: 78 MG/DL (ref 70–99)
PERFORMED ON: ABNORMAL
PERFORMED ON: NORMAL

## 2020-09-03 PROCEDURE — 2580000003 HC RX 258: Performed by: PAIN MEDICINE

## 2020-09-03 PROCEDURE — 2500000003 HC RX 250 WO HCPCS: Performed by: PAIN MEDICINE

## 2020-09-03 PROCEDURE — 99152 MOD SED SAME PHYS/QHP 5/>YRS: CPT | Performed by: PAIN MEDICINE

## 2020-09-03 PROCEDURE — 77003 FLUOROGUIDE FOR SPINE INJECT: CPT | Performed by: PAIN MEDICINE

## 2020-09-03 PROCEDURE — 64635 DESTROY LUMB/SAC FACET JNT: CPT | Performed by: PAIN MEDICINE

## 2020-09-03 PROCEDURE — 64636 DESTROY L/S FACET JNT ADDL: CPT | Performed by: PAIN MEDICINE

## 2020-09-03 PROCEDURE — 3600000012 HC SURGERY LEVEL 2 ADDTL 15MIN: Performed by: PAIN MEDICINE

## 2020-09-03 PROCEDURE — 7100000011 HC PHASE II RECOVERY - ADDTL 15 MIN: Performed by: PAIN MEDICINE

## 2020-09-03 PROCEDURE — 2709999900 HC NON-CHARGEABLE SUPPLY: Performed by: PAIN MEDICINE

## 2020-09-03 PROCEDURE — 3600000002 HC SURGERY LEVEL 2 BASE: Performed by: PAIN MEDICINE

## 2020-09-03 PROCEDURE — 7100000010 HC PHASE II RECOVERY - FIRST 15 MIN: Performed by: PAIN MEDICINE

## 2020-09-03 RX ORDER — LIDOCAINE HYDROCHLORIDE 10 MG/ML
INJECTION, SOLUTION EPIDURAL; INFILTRATION; INTRACAUDAL; PERINEURAL PRN
Status: DISCONTINUED | OUTPATIENT
Start: 2020-09-03 | End: 2020-09-03 | Stop reason: ALTCHOICE

## 2020-09-03 RX ORDER — LIDOCAINE HYDROCHLORIDE 10 MG/ML
INJECTION, SOLUTION EPIDURAL; INFILTRATION; INTRACAUDAL; PERINEURAL
Status: COMPLETED
Start: 2020-09-03 | End: 2020-09-03

## 2020-09-03 RX ORDER — SODIUM CHLORIDE, SODIUM LACTATE, POTASSIUM CHLORIDE, CALCIUM CHLORIDE 600; 310; 30; 20 MG/100ML; MG/100ML; MG/100ML; MG/100ML
INJECTION, SOLUTION INTRAVENOUS CONTINUOUS
Status: DISCONTINUED | OUTPATIENT
Start: 2020-09-03 | End: 2020-09-03 | Stop reason: HOSPADM

## 2020-09-03 RX ORDER — LIDOCAINE HYDROCHLORIDE 20 MG/ML
INJECTION, SOLUTION EPIDURAL; INFILTRATION; INTRACAUDAL; PERINEURAL PRN
Status: DISCONTINUED | OUTPATIENT
Start: 2020-09-03 | End: 2020-09-03 | Stop reason: ALTCHOICE

## 2020-09-03 RX ADMIN — SODIUM CHLORIDE, POTASSIUM CHLORIDE, SODIUM LACTATE AND CALCIUM CHLORIDE: 600; 310; 30; 20 INJECTION, SOLUTION INTRAVENOUS at 12:11

## 2020-09-03 RX ADMIN — LIDOCAINE HYDROCHLORIDE 0.1 ML: 10 INJECTION, SOLUTION EPIDURAL; INFILTRATION; INTRACAUDAL; PERINEURAL at 12:10

## 2020-09-03 ASSESSMENT — PAIN - FUNCTIONAL ASSESSMENT
PAIN_FUNCTIONAL_ASSESSMENT: 0-10
PAIN_FUNCTIONAL_ASSESSMENT: PREVENTS OR INTERFERES WITH MANY ACTIVE NOT PASSIVE ACTIVITIES

## 2020-09-03 NOTE — PROCEDURES
Brayan Dawn is a 67 y.o. male patient. No diagnosis found. Past Medical History:   Diagnosis Date    Cancer ([facility] [address]     TESTICLE    Diabetes mellitus ([facility] [address]     Hx of blood clots     Hyperlipidemia     Hypertension      Blood pressure (!) 149/54, pulse 54, temperature 97.5 °F (36.4 °C), temperature source Temporal, resp. rate 16, height 5' 9\" (1.753 m), weight 190 lb (86.2 kg), SpO2 96 %. Zenaida Payan MD  9/3/2020  DICTATING PHYSICIAN: Cole Darling M.D        PREOPERATIVE DIAGNOSES: Lumbar Spondylosis 721.3, M47.816, M47.817       POSTOPERATIVE DIAGNOSES: Lumbar Spondylosis       OPERATIVE PROCEDURES:  Radiofrequency Neurotomy of medial branches at _R L3,L4,L5__ levels to block the L45 and L5S1 facet joints. 30283 and 64636 X 1 WITH 62652,    SURGEON: Cole Darling M.D   INDICATIONS:  Lumbar Spondylosis  OPERATIVE PROCEDURE:  Consent signed by patient after risks and benefits explained. Patient was in prone position. Back was prepped with Prevail and draped. Aseptic technique used at every stage of the procedure. Skin wheal with 1% Lidocaine with 30-gauge needle. A _18__ -gauge, _100__ -mm, curved tip radiofrequency needle with _10__ -mm active tip was placed under fluoroscopic guidance using A.P. views at the junction of superior articular process and sacral ala on the _R__ sides to access the __L5__ medial branches. The needle was walked anteriorly and superiorly by couple of millimeters. Then the lateral view was checked to make sure the tip of the needle is at the posterior part of the spine, clearly posterior to the posterior margin of the neural foramen at that level by at least couple of millimeters. Sensory stimulation at 50 hertz at 0.5 v did not create any sensation in the legs. Motor stimulation at 2 hertz at 2 volts did not produce any muscle contraction in the _R__ legs. Sensory stimulation at 50hz at 0.5v did not create any pain down the leg.  0.3 cc of 1% lidocaine was injected and radiofrequency lesioning was done for 90 seconds at 80 degree centigrade. Needle pulled out intact. Exact similar procedure was performed at the junction of superior articular process and transverse process at _R L4,L5__ vertebral body levels to burn the __R L3,L4__ medial branches. Paraspinal muscle twitching was seen at _R L4,L5 __ vertebral levels. Patient did not feel radicular pain either during needle placement or during lesioning. Patient tolerated the procedure well. Intravenous sedation was with _100  mcg of Fentanyl and _2__ mg of Versed. PT HAD TAMIR WITH AFIB[OLD] GOT CLEARANCE FROM DR. PANCHAL. SPOKE TO HIM. INR WAS 2.3    ESTIMATED BLOOD LOSS:  Less than 1 cc      Patient was monitored and stable. Discharge instructions were given.

## 2020-09-03 NOTE — PROGRESS NOTES
PER MD DISCRETION, PATIENT CURRENT CARDIOLOGIST CALLED BY MD AND VERIFIED NON-SYMPTOMATIC BRADYCARDIA AND COUMADIN LEVELS AS WELL AS CONFIRMED A-FIB DIAGNOSIS.   PER CARDIOLOGIST, OK TO PROCEED WITH RADIOFREQUENCY

## 2020-09-03 NOTE — PROGRESS NOTES
Pt and wife requested we call pt cardiologist office regarding low heart rate, called Dr Melina Meza office, phone given to Dr Barbara Olsen, Dr Barbara Olsen consulted with Dr Feliciano Chatman, will continue to monitor pt.

## 2020-09-09 ENCOUNTER — ANTI-COAG VISIT (OUTPATIENT)
Dept: PHARMACY | Age: 75
End: 2020-09-09
Payer: MEDICARE

## 2020-09-09 LAB — INTERNATIONAL NORMALIZATION RATIO, POC: 2.5

## 2020-09-09 PROCEDURE — 99211 OFF/OP EST MAY X REQ PHY/QHP: CPT | Performed by: PHARMACIST

## 2020-09-09 PROCEDURE — 85610 PROTHROMBIN TIME: CPT | Performed by: PHARMACIST

## 2020-09-23 ENCOUNTER — ANTI-COAG VISIT (OUTPATIENT)
Dept: PHARMACY | Age: 75
End: 2020-09-23
Payer: MEDICARE

## 2020-09-23 LAB — INR BLD: 1.7

## 2020-09-23 PROCEDURE — 99211 OFF/OP EST MAY X REQ PHY/QHP: CPT | Performed by: PHARMACIST

## 2020-09-23 PROCEDURE — 85610 PROTHROMBIN TIME: CPT | Performed by: PHARMACIST

## 2020-09-23 NOTE — PROGRESS NOTES
MrRegis Alba is here for management of anticoagulation for Afib. PMH also significant for HTN, HLD, DM, Hx DVT. He presents today w/out complaint. Pt verifies dosing regimen as listed above. Pt denies s/sx bleeding/bruising/swelling/SOB. No changes in RX/OTCs/Herbal medications. No dietary concerns  Denies EToH and tobacco use. He has previously been on warfarin for DVTs in the past. Was on Eliquis for a while but developed a rash so was switched back to warfarin. Pt seen in office  97.5 deg F    No clear reason for lower INR today, pt's INR has been up and down the past month since hospital visit. Will increase by half a tablet weekly and check in three weeks. INR 1.7 is below aceptable therapeutic range of 2-3. Recommend to increase dose to 4.5mg wed and 3 mg daily. Patient has 3 mg tablets now (still some 5mg )  Recheck INR in 3 weeks  Dosing reminder card given with phone number, appointment date and time. Return to clinic: 10/14 @ 1100    Received call from [facility]. They requested results sent from each visit. Fax# Shira  PharmD Candidate [id number] [facility]    I have seen the patient and reviewed the progress note written by the PharmD Candidate. I agree with this assessment and plan.    Cong Cortes, Genevieve 9/23/2020 10:36 AM

## 2020-10-14 ENCOUNTER — ANTI-COAG VISIT (OUTPATIENT)
Dept: PHARMACY | Age: 75
End: 2020-10-14
Payer: MEDICARE

## 2020-10-14 LAB — INR BLD: 1.4

## 2020-10-14 PROCEDURE — 85610 PROTHROMBIN TIME: CPT | Performed by: PHARMACIST

## 2020-10-14 PROCEDURE — 99211 OFF/OP EST MAY X REQ PHY/QHP: CPT | Performed by: PHARMACIST

## 2020-10-14 NOTE — PROGRESS NOTES
MrRegis Watters is here for management of anticoagulation for Afib. PMH also significant for HTN, HLD, DM, Hx DVT. He presents today w/out complaint. Pt verifies dosing regimen as listed above. Pt denies s/sx bleeding/bruising/swelling/SOB. No changes in RX/OTCs/Herbal medications. No dietary concerns  Denies EToH and tobacco use. He has previously been on warfarin for DVTs in the past. Was on Eliquis for a while but developed a rash so was switched back to warfarin. No changes in medications, health, or side effects. No clear reason for lower INR today. INR 1.4 is below aceptable therapeutic range of 2-3. Recommend to increase dose to 3 mg daily except 4.5 mg MWF. Patient has 3 mg tablets now (still some 5mg )  Recheck INR in 2 weeks  Dosing reminder card given with phone number, appointment date, and time. Return to clinic: 10/28/20 @ 11:00 am    [facility] results sent from each visit. Fax# [phone] [facility] PharmD Candidate 2021    I have seen the patient and reviewed the progress note written by the PharmD Candidate. I agree with this assessment and plan.    August Vargas PharmD 10/14/2020 10:13 AM

## 2020-10-28 ENCOUNTER — ANTI-COAG VISIT (OUTPATIENT)
Dept: PHARMACY | Age: 75
End: 2020-10-28
Payer: MEDICARE

## 2020-10-28 LAB — INTERNATIONAL NORMALIZATION RATIO, POC: 2.4

## 2020-10-28 PROCEDURE — 99211 OFF/OP EST MAY X REQ PHY/QHP: CPT | Performed by: PHARMACIST

## 2020-10-28 PROCEDURE — 85610 PROTHROMBIN TIME: CPT | Performed by: PHARMACIST

## 2020-10-28 NOTE — PROGRESS NOTES
Mr. Aurelio Henriquez is here for management of anticoagulation for Afib. PMH also significant for HTN, HLD, DM, Hx DVT. He presents today w/out complaint. Pt verifies dosing regimen as listed above. Pt denies s/sx bleeding/bruising/swelling/SOB. No changes in RX/OTCs/Herbal medications. No dietary concerns  Denies EToH and tobacco use. He has previously been on warfarin for DVTs in the past. Was on Eliquis for a while but developed a rash so was switched back to warfarin. INR back in range today. No other changes per pt. INR 2.4 is within aceptable therapeutic range of 2-3. Recommend to continue dose of 3 mg daily except 4.5 mg MWF. Patient has 3 mg tablets now (still some 5mg )  Recheck INR in 2 weeks  Dosing reminder card given with phone number, appointment date, and time. Return to clinic: 11/25/20 @ 11:00 am    [facility] requests results sent from each visit.  Fax# [phone]    Rea Horn PharmD 10:01 AM EDT 10/28/20

## 2020-11-18 PROBLEM — M51.36 LUMBAR DEGENERATIVE DISC DISEASE: Status: ACTIVE | Noted: 2020-11-18

## 2020-11-18 PROBLEM — M47.816 LUMBAR FACET ARTHROPATHY: Status: ACTIVE | Noted: 2020-11-18

## 2020-11-18 PROBLEM — M48.061 SPINAL STENOSIS OF LUMBAR REGION: Status: ACTIVE | Noted: 2020-11-18

## 2020-11-18 PROBLEM — M51.369 LUMBAR DEGENERATIVE DISC DISEASE: Status: ACTIVE | Noted: 2020-11-18

## 2020-11-25 ENCOUNTER — ANTI-COAG VISIT (OUTPATIENT)
Dept: PHARMACY | Age: 75
End: 2020-11-25
Payer: MEDICARE

## 2020-11-25 LAB — INTERNATIONAL NORMALIZATION RATIO, POC: 1.8

## 2020-11-25 PROCEDURE — 99211 OFF/OP EST MAY X REQ PHY/QHP: CPT | Performed by: PHARMACIST

## 2020-11-25 PROCEDURE — 85610 PROTHROMBIN TIME: CPT | Performed by: PHARMACIST

## 2020-11-25 NOTE — PROGRESS NOTES
Mr. Aurelio Henriquez is here for management of anticoagulation for Afib. PMH also significant for HTN, HLD, DM, Hx DVT. He presents today w/out complaint. Pt verifies dosing regimen as listed above. Pt denies s/sx bleeding/bruising/swelling/SOB. No changes in RX/OTCs/Herbal medications. No dietary concerns  Denies EToH and tobacco use. He has previously been on warfarin for DVTs in the past. Was on Eliquis for a while but developed a rash so was switched back to warfarin. No changes or missed doses per pt.    pt seen in office    INR 1.8 is below aceptable therapeutic range of 2-3. Recommend to increase dose to 3 mg daily except 4.5 mg MWFSat. Patient has 3 mg tablets now (still some 5mg )  Recheck INR in 4 weeks  Dosing reminder card given with phone number, appointment date, and time. Return to clinic: 12/23/20 @ 11:00 am    [facility] requests results sent from each visit.  Fax# [phone]    Rea Horn PharmD 10:06 AM EST 11/25/20

## 2020-12-23 ENCOUNTER — ANTI-COAG VISIT (OUTPATIENT)
Dept: PHARMACY | Age: 75
End: 2020-12-23
Payer: MEDICARE

## 2020-12-23 LAB — INR BLD: 1.4

## 2020-12-23 PROCEDURE — 85610 PROTHROMBIN TIME: CPT | Performed by: PHARMACIST

## 2020-12-23 PROCEDURE — 99211 OFF/OP EST MAY X REQ PHY/QHP: CPT | Performed by: PHARMACIST

## 2020-12-23 NOTE — PROGRESS NOTES
Mr. Kain Fraser is here for management of anticoagulation for Afib. PMH also significant for HTN, HLD, DM, Hx DVT. He presents today w/out complaint. Pt verifies dosing regimen as listed above. Pt denies s/sx bleeding/bruising/swelling/SOB. No changes in RX/OTCs/Herbal medications. No dietary concerns  Denies EToH and tobacco use. He has previously been on warfarin for DVTs in the past. Was on Eliquis for a while but developed a rash so was switched back to warfarin. No changes or missed doses per pt.    pt seen in office    INR 1.4 is below aceptable therapeutic range of 2-3. Recommend to take 6 mg today only then increase dose to 4.5 mg daily except 3 mg on Sundays. Patient has 3 mg tablets now (still some 5mg )  Recheck INR in 2 weeks  Dosing reminder card given with phone number, appointment date, and time. Return to clinic: 1/6/20 @ 11:00 am    [facility] requests results sent from each visit. Fax# [phone] [facility]  PharmD Candidate    I have seen the patient and reviewed the progress note written by the PharmD Candidate. I agree with this assessment and plan.    Samira Mcguire, Genevieve 12/23/2020 10:14 AM

## 2021-01-06 ENCOUNTER — ANTI-COAG VISIT (OUTPATIENT)
Dept: PHARMACY | Age: 76
End: 2021-01-06
Payer: MEDICARE

## 2021-01-06 DIAGNOSIS — I48.19 PERSISTENT ATRIAL FIBRILLATION (HCC): ICD-10-CM

## 2021-01-06 LAB — INTERNATIONAL NORMALIZATION RATIO, POC: 1.9

## 2021-01-06 PROCEDURE — 85610 PROTHROMBIN TIME: CPT | Performed by: PHARMACIST

## 2021-01-06 PROCEDURE — 99211 OFF/OP EST MAY X REQ PHY/QHP: CPT | Performed by: PHARMACIST

## 2021-01-06 NOTE — PROGRESS NOTES
Mr. Jaimie Mitchell is here for management of anticoagulation for Afib. PMH also significant for HTN, HLD, DM, Hx DVT. He presents today w/out complaint. Pt verifies dosing regimen as listed above. Pt denies s/sx bleeding/bruising/swelling/SOB. No changes in RX/OTCs/Herbal medications. No dietary concerns  Denies EToH and tobacco use. He has previously been on warfarin for DVTs in the past. Was on Eliquis for a while but developed a rash so was switched back to warfarin. No changes or missed doses per pt. INR improved today but still slightly low.  will make small dose increase today    pt seen in office    INR 1.9 is slightly below aceptable therapeutic range of 2-3. Recommend to increase dose to 4.5 mg daily  Patient has 3 mg tablets now (still some 5mg )  Recheck INR in 3 weeks  Dosing reminder card given with phone number, appointment date, and time. Return to clinic: 1/27/20 @ 11:00 am    [facility] results sent from each visit.  Fax# [phone]    [place], PharmD 10:08 AM EST 1/6/21

## 2021-01-27 ENCOUNTER — ANTI-COAG VISIT (OUTPATIENT)
Dept: PHARMACY | Age: 76
End: 2021-01-27
Payer: MEDICARE

## 2021-01-27 DIAGNOSIS — I48.19 PERSISTENT ATRIAL FIBRILLATION (HCC): ICD-10-CM

## 2021-01-27 LAB — INTERNATIONAL NORMALIZATION RATIO, POC: 1.7

## 2021-01-27 PROCEDURE — 85610 PROTHROMBIN TIME: CPT | Performed by: PHARMACIST

## 2021-01-27 PROCEDURE — 99211 OFF/OP EST MAY X REQ PHY/QHP: CPT | Performed by: PHARMACIST

## 2021-01-27 NOTE — PROGRESS NOTES
Mr. Nelli Hemphill is here for management of anticoagulation for Afib. PMH also significant for HTN, HLD, DM, Hx DVT. He presents today w/out complaint. Pt verifies dosing regimen as listed above. Pt denies s/sx bleeding/bruising/swelling/SOB. No changes in RX/OTCs/Herbal medications. No dietary concerns  Denies EToH and tobacco use. He has previously been on warfarin for DVTs in the past. Was on Eliquis for a while but developed a rash so was switched back to warfarin. No changes or missed doses per pt. INR remains low despite dose increase last visit. Will increase dose. Pt would like to go back to using 5 mg tablets. pt seen in office    INR 1.7 is below aceptable therapeutic range of 2-3. Recommend to increase dose to 5 mg daily  Patient has 3 mg tablets now (still some 5mg )  Recheck INR in 3 weeks  Dosing reminder card given with phone number, appointment date, and time. Return to clinic: 2/17/20 @ 11:00 am    [facility] results sent from each visit.  Fax# [phone]    Chase Gutierrez PharmD 9:43 AM EST 1/27/21

## 2021-02-24 ENCOUNTER — ANTI-COAG VISIT (OUTPATIENT)
Dept: PHARMACY | Age: 76
End: 2021-02-24
Payer: MEDICARE

## 2021-02-24 DIAGNOSIS — I48.19 PERSISTENT ATRIAL FIBRILLATION (HCC): ICD-10-CM

## 2021-02-24 LAB — INR BLD: 3.7

## 2021-02-24 PROCEDURE — 99211 OFF/OP EST MAY X REQ PHY/QHP: CPT | Performed by: PHARMACIST

## 2021-02-24 PROCEDURE — 85610 PROTHROMBIN TIME: CPT | Performed by: PHARMACIST

## 2021-02-24 NOTE — PROGRESS NOTES
Mr. Shilo Vargas is here for management of anticoagulation for Afib. PMH also significant for HTN, HLD, DM, Hx DVT. He presents today w/out complaint. Pt verifies dosing regimen as listed above. Pt denies s/sx bleeding/bruising/swelling/SOB. No changes in RX/OTCs/Herbal medications. No dietary concerns  Denies EToH and tobacco use. He has previously been on warfarin for DVTs in the past. Was on Eliquis for a while but developed a rash so was switched back to warfarin. No changes or missed doses per pt. Pt has been taking 4.5mg daily. INR 3.7 is above aceptable therapeutic range of 2-3. Recommend to hold today only and to decrease to 3mg Sundays and 4.5mg all other days. Patient has 3 mg tablets now (still some 5mg )  Recheck INR in 3 weeks  Dosing reminder card given with phone number, appointment date, and time. Return to clinic: 3/17/20 @ 11:00 am    [facility] results sent from each visit. Fax# [phone] PharmD Candidate 2021    I have seen the patient and reviewed the progress note written by the PharmD Candidate. I agree with this assessment and plan.    Rosaura Brewer PharmD 2/24/2021 9:57 AM

## 2021-03-17 ENCOUNTER — ANTI-COAG VISIT (OUTPATIENT)
Dept: PHARMACY | Age: 76
End: 2021-03-17
Payer: MEDICARE

## 2021-03-17 DIAGNOSIS — I48.19 PERSISTENT ATRIAL FIBRILLATION (HCC): ICD-10-CM

## 2021-03-17 LAB — INTERNATIONAL NORMALIZATION RATIO, POC: 3.9

## 2021-03-17 PROCEDURE — 85610 PROTHROMBIN TIME: CPT | Performed by: PHARMACIST

## 2021-03-17 PROCEDURE — 99211 OFF/OP EST MAY X REQ PHY/QHP: CPT | Performed by: PHARMACIST

## 2021-03-17 NOTE — PROGRESS NOTES
MrRegis Garcia is here for management of anticoagulation for Afib. PMH also significant for HTN, HLD, DM, Hx DVT. He presents today w/out complaint. Pt verifies dosing regimen as listed above. Pt denies s/sx bleeding/bruising/swelling/SOB. No changes in RX/OTCs/Herbal medications. No dietary concerns  Denies EToH and tobacco use. He has previously been on warfarin for DVTs in the past. Was on Eliquis for a while but developed a rash so was switched back to warfarin. Started on Vit B-12 1000 mcg daily  Had labwork done at [facility] recently which showed some blood in urine? He has not noticed any discoloration. No other changes. INR 3.9 is above aceptable therapeutic range of 2-3. Recommend to hold today only and to decrease to 3mg MWF and 4.5mg all other days. Patient has 3 mg tablets now (still some 5mg )  Recheck INR in 3 weeks  Dosing reminder card given with phone number, appointment date, and time. Return to clinic: 4/7/21 @ 11:00 am    [facility] results sent from each visit.  Fax# [phone]    John Munoz PharmD 9:54 AM EDT 3/17/21

## 2021-04-07 ENCOUNTER — ANTI-COAG VISIT (OUTPATIENT)
Dept: PHARMACY | Age: 76
End: 2021-04-07
Payer: MEDICARE

## 2021-04-07 DIAGNOSIS — I48.19 PERSISTENT ATRIAL FIBRILLATION (HCC): ICD-10-CM

## 2021-04-07 LAB — INTERNATIONAL NORMALIZATION RATIO, POC: 2.6

## 2021-04-07 PROCEDURE — 99211 OFF/OP EST MAY X REQ PHY/QHP: CPT | Performed by: PHARMACIST

## 2021-04-07 PROCEDURE — 85610 PROTHROMBIN TIME: CPT | Performed by: PHARMACIST

## 2021-05-10 ENCOUNTER — ANTI-COAG VISIT (OUTPATIENT)
Dept: PHARMACY | Age: 76
End: 2021-05-10
Payer: MEDICARE

## 2021-05-10 DIAGNOSIS — I48.19 PERSISTENT ATRIAL FIBRILLATION (HCC): ICD-10-CM

## 2021-05-10 LAB — INR BLD: 3.8

## 2021-05-10 PROCEDURE — 99211 OFF/OP EST MAY X REQ PHY/QHP: CPT | Performed by: FAMILY MEDICINE

## 2021-05-10 PROCEDURE — 85610 PROTHROMBIN TIME: CPT | Performed by: FAMILY MEDICINE

## 2021-05-10 NOTE — PROGRESS NOTES
MrRegis Paulson is here for management of anticoagulation for Afib. PMH also significant for HTN, HLD, DM, Hx DVT. He presents today w/out complaint. Pt verifies dosing regimen as listed above. Pt denies s/sx bleeding/bruising/swelling/SOB. No changes in RX/OTCs/Herbal medications. No dietary concerns  Denies EToH and tobacco use. He has previously been on warfarin for DVTs in the past. Was on Eliquis for a while but developed a rash so was switched back to warfarin. No changes. INR 3.8 is above aceptable therapeutic range of 2-3. Recommend to hold dose today, then decrease to 3mg MWF/Sat and 4.5mg all other days. Patient has 3 mg tablets now (still some 5mg )  Recheck INR in 2 weeks  Dosing reminder card given with phone number, appointment date, and time. Return to clinic: 5/24 @ 11 am     [facility] results sent from each visit.  Fax# [phone]    Stiven Peñaloza PharmD 5/10/2021 9:41 AM

## 2021-05-20 ENCOUNTER — OFFICE VISIT (OUTPATIENT)
Dept: ORTHOPEDIC SURGERY | Age: 76
End: 2021-05-20
Payer: MEDICARE

## 2021-05-20 VITALS — HEIGHT: 69 IN | BODY MASS INDEX: 28.88 KG/M2 | WEIGHT: 195 LBS

## 2021-05-20 DIAGNOSIS — M48.062 SPINAL STENOSIS OF LUMBAR REGION WITH NEUROGENIC CLAUDICATION: ICD-10-CM

## 2021-05-20 DIAGNOSIS — M54.50 LUMBAR PAIN: Primary | ICD-10-CM

## 2021-05-20 PROCEDURE — 1123F ACP DISCUSS/DSCN MKR DOCD: CPT | Performed by: PHYSICIAN ASSISTANT

## 2021-05-20 PROCEDURE — 4040F PNEUMOC VAC/ADMIN/RCVD: CPT | Performed by: PHYSICIAN ASSISTANT

## 2021-05-20 PROCEDURE — 99214 OFFICE O/P EST MOD 30 MIN: CPT | Performed by: PHYSICIAN ASSISTANT

## 2021-05-20 PROCEDURE — G8417 CALC BMI ABV UP PARAM F/U: HCPCS | Performed by: PHYSICIAN ASSISTANT

## 2021-05-20 PROCEDURE — G8427 DOCREV CUR MEDS BY ELIG CLIN: HCPCS | Performed by: PHYSICIAN ASSISTANT

## 2021-05-20 PROCEDURE — 1036F TOBACCO NON-USER: CPT | Performed by: PHYSICIAN ASSISTANT

## 2021-05-20 RX ORDER — TAMSULOSIN HYDROCHLORIDE 0.4 MG/1
0.4 CAPSULE ORAL DAILY
COMMUNITY
Start: 2021-04-21

## 2021-05-20 NOTE — PROGRESS NOTES
New Patient: LUMBAR SPINE    Referring Provider:  No ref. provider found    CHIEF COMPLAINT:    Chief Complaint   Patient presents with    Back Pain     lumbar pain for a while but worse recently, 9/10 pain hx of injections       HISTORY OF PRESENT ILLNESS:       Mr. Adriana Martinez  is a pleasant 67 y.o. male here for evaluation regarding his LBP and right greater than left leg pain. He states his pain began insidiously about several years ago. His pain has steadily increased since then. He rates his back pain 9/10 and right greater than left leg pain 9/10. He describes the pain as constant. Pain is worse with prolonged standing and walking and improved some with leaning forward, sitting in a straight chair, and resting. The leg pain radiates down the posterior lateral aspect of his right greater than left leg to his 8. He denies numbness and tingling in his right or left leg. He has a sense of weakness of his right greater than left leg and denies any current bowel or bladder dysfunction. The pain at times disrupts his sleep. Patient has a history of previous epidural steroid injections and his wife states that his last injection was in November 2020 with some benefit.   Pain Assessment  Location of Pain: Back  Severity of Pain: 9  Quality of Pain: Other (Comment)  Duration of Pain: Other (Comment)]    Current/Past Treatment:   · Physical Therapy: None recently  · Chiropractic: None  · Injection: Several previous epidural steroid injections with varying degrees of benefit  · Medications: Neurontin    Past Medical History:   Past Medical History:   Diagnosis Date    [facility] ([facility] [facility]     TESTICLE    Diabetes mellitus ([facility] [address] [facility]     Hx of blood clots     Hyperlipidemia     Hypertension         Past Surgical History:     Past Surgical History:   Procedure Laterality Date    CARDIAC SURGERY      OPEN HEART    CAROTID ENDARTERECTOMY Right 2001    CORONARY ANGIOPLASTY WITH STENT PLACEMENT      PAIN MANAGEMENT PROCEDURE Right 9/3/2020    RIGHT LUMBAR THREE LUMBAR FOUR LUMBAR FIVE RADIO FREQUENCY ABLATION  SITE CONFIRMED BY FLUOROSCOPY performed by Rachana Nicole MD at [address]      [place]       Current Medications:     Current Outpatient Medications:     tamsulosin (FLOMAX) 0.4 MG capsule, , Disp: , Rfl:     gabapentin (NEURONTIN) 300 MG capsule, 1-2 po qhs, Disp: 45 capsule, Rfl: 2    metFORMIN (GLUCOPHAGE) 850 MG tablet, Take 850 mg by mouth 2 times daily (with meals), Disp: , Rfl:     oxybutynin (DITROPAN) 5 MG tablet, Take 5 mg by mouth 2 times daily, Disp: , Rfl:     lisinopril (PRINIVIL;ZESTRIL) 10 MG tablet, Take 5 mg by mouth daily, Disp: , Rfl:     omeprazole (PRILOSEC) 20 MG delayed release capsule, Take 20 mg by mouth Daily, Disp: , Rfl:     vitamin D 1000 units CAPS, Take 2,000 Units by mouth 2 times daily, Disp: , Rfl:     atorvastatin (LIPITOR) 40 MG tablet, Take 40 mg by mouth daily, Disp: , Rfl:     warfarin (COUMADIN) 5 MG tablet, Take 5 mg by mouth daily, Disp: , Rfl:     aspirin 81 MG EC tablet, Take 81 mg by mouth daily, Disp: , Rfl:     verapamil (CALAN-SR) 240 MG CR tablet, Take 120 mg by mouth nightly , Disp: , Rfl:     glimepiride (AMARYL) 4 MG tablet, Take 6 mg by mouth every morning (before breakfast) , Disp: , Rfl:     SENNOSIDES PO, Take 17.2 mg by mouth 2 times daily , Disp: , Rfl:     Allergies:  Codeine and Erythromycin    Social History:    reports that he has never smoked. He has never used smokeless tobacco. He reports that he does not drink alcohol. Family History:   No family history on file.     REVIEW OF SYSTEMS: Full ROS noted & scanned   CONSTITUTIONAL: Denies unexplained weight loss, fevers, chills or fatigue  NEUROLOGICAL: Denies unsteady gait or progressive weakness  MUSCULOSKELETAL: Denies joint swelling or redness  PSYCHOLOGICAL: Denies anxiety, depression   SKIN: Denies skin changes, delayed healing, rash, itching   HEMATOLOGIC: Denies easy bleeding or bruising  ENDOCRINE: Denies excessive thirst, urination, heat/cold  RESPIRATORY: Denies current dyspnea, cough  GI: Denies nausea, vomiting, diarrhea   : Denies bowel or bladder issues      PHYSICAL EXAM:    Vitals: Height 5' 9.02\" (1.753 m), weight 195 lb (88.5 kg). GENERAL EXAM:  · General Apparence: Patient is adequately groomed with no evidence of malnutrition. · Orientation: The patient is oriented to time, place and person. · Mood & Affect:The patient's mood and affect are appropriate. · Vascular: Examination reveals no swelling tenderness in upper or lower extremities. Good capillary refill. · Lymphatic: The lymphatic examination bilaterally reveals all areas to be without enlargement or induration  · Sensation: Sensation is intact without deficit  · Coordination/Balance: Adequate coordination. Tandem walking difficult due to balance and pain in his right leg    LUMBAR/SACRAL EXAMINATION:  · Inspection: Local inspection shows no step-off or bruising. Lumbar alignment is normal.  Sagittal and Coronal balance is neutral.      · Palpation:   No evidence of tenderness at the midline. No tenderness bilaterally at the paraspinal or trochanters. There is no step-off or paraspinal spasm. · Range of Motion: Lumbar flexion, extension and rotation are moderately limited due to pain especially with flexion extension. · Strength:   Strength testing is -5 /5 in all muscle groups tested. · Special Tests:   Straight leg raise and crossed SLR negative. Leg length and pelvis level. · Skin: There are no rashes, ulcerations or lesions. · Reflexes: Reflexes are symmetrically 2+ at the patellar and ankle tendons. Clonus absent bilaterally at the feet. · Gait & station: normal, patient ambulates without assistance    · Additional Examinations:   · RIGHT LOWER EXTREMITY: Inspection/examination of the right lower extremity does not show any tenderness, deformity or injury.  Range of motion is unremarkable. There is no gross instability. There are no rashes, ulcerations or lesions. Strength and tone are normal.  · LEFT LOWER EXTREMITY:  Inspection/examination of the left lower extremity does not show any tenderness, deformity or injury. Range of motion is unremarkable. There is no gross instability. There are no rashes, ulcerations or lesions. Strength and tone are normal.    Diagnostic Testing:    X-rays: 2 views of the lumbar spine to include AP and lateral were obtained in the office today which reveals severe spondylosis with disc space narrowing especially L5-S1. There are no acute fractures noted. Impression:   Severe lumbar spondylosis with DDD  Spinal stenosis  Facet arthropathy      Plan:      · We discussed diagnosis and treatment options including observation, additional oral steroids, physical therapy, epidural injections and additional imaging. He wishes to proceed with MRI of the lumbar spine to evaluate the spinal stenosis. Follow up -after the MRI to review the results and discuss treatment options.       Patient examined and note dictated by Johnathan Bernabe PA-C.

## 2021-05-24 ENCOUNTER — ANTI-COAG VISIT (OUTPATIENT)
Dept: PHARMACY | Age: 76
End: 2021-05-24
Payer: MEDICARE

## 2021-05-24 DIAGNOSIS — I48.19 PERSISTENT ATRIAL FIBRILLATION (HCC): Primary | ICD-10-CM

## 2021-05-24 LAB — INTERNATIONAL NORMALIZATION RATIO, POC: 1.5

## 2021-05-24 PROCEDURE — 99211 OFF/OP EST MAY X REQ PHY/QHP: CPT | Performed by: PHARMACIST

## 2021-05-24 PROCEDURE — 85610 PROTHROMBIN TIME: CPT | Performed by: PHARMACIST

## 2021-05-27 ENCOUNTER — HOSPITAL ENCOUNTER (OUTPATIENT)
Dept: MRI IMAGING | Age: 76
Discharge: HOME | End: 2021-05-27
Payer: MEDICARE

## 2021-05-27 DIAGNOSIS — M48.062 SPINAL STENOSIS OF LUMBAR REGION WITH NEUROGENIC CLAUDICATION: ICD-10-CM

## 2021-05-27 PROCEDURE — 72148 MRI LUMBAR SPINE W/O DYE: CPT

## 2021-06-07 ENCOUNTER — OFFICE VISIT (OUTPATIENT)
Dept: ORTHOPEDIC SURGERY | Age: 76
End: 2021-06-07
Payer: MEDICARE

## 2021-06-07 ENCOUNTER — ANTI-COAG VISIT (OUTPATIENT)
Dept: PHARMACY | Age: 76
End: 2021-06-07
Payer: MEDICARE

## 2021-06-07 VITALS — HEIGHT: 69 IN | BODY MASS INDEX: 28.88 KG/M2 | WEIGHT: 195 LBS

## 2021-06-07 DIAGNOSIS — M48.061 SPINAL STENOSIS AT L4-L5 LEVEL: Primary | ICD-10-CM

## 2021-06-07 DIAGNOSIS — I48.19 PERSISTENT ATRIAL FIBRILLATION (HCC): Primary | ICD-10-CM

## 2021-06-07 LAB — INR BLD: 1.5

## 2021-06-07 PROCEDURE — 85610 PROTHROMBIN TIME: CPT | Performed by: FAMILY MEDICINE

## 2021-06-07 PROCEDURE — 99214 OFFICE O/P EST MOD 30 MIN: CPT | Performed by: ORTHOPAEDIC SURGERY

## 2021-06-07 PROCEDURE — G8427 DOCREV CUR MEDS BY ELIG CLIN: HCPCS | Performed by: ORTHOPAEDIC SURGERY

## 2021-06-07 PROCEDURE — 1036F TOBACCO NON-USER: CPT | Performed by: ORTHOPAEDIC SURGERY

## 2021-06-07 PROCEDURE — 99211 OFF/OP EST MAY X REQ PHY/QHP: CPT | Performed by: FAMILY MEDICINE

## 2021-06-07 PROCEDURE — G8417 CALC BMI ABV UP PARAM F/U: HCPCS | Performed by: ORTHOPAEDIC SURGERY

## 2021-06-07 PROCEDURE — 1123F ACP DISCUSS/DSCN MKR DOCD: CPT | Performed by: ORTHOPAEDIC SURGERY

## 2021-06-07 PROCEDURE — 4040F PNEUMOC VAC/ADMIN/RCVD: CPT | Performed by: ORTHOPAEDIC SURGERY

## 2021-06-07 NOTE — PROGRESS NOTES
New Patient: LUMBAR SPINE    Referring Provider:  No ref. provider found    CHIEF COMPLAINT:    Chief Complaint   Patient presents with    Back Pain     TR MRI lumbar       HISTORY OF PRESENT ILLNESS:       Mr. Doc Umana  is a pleasant 67 y.o. male here for evaluation regarding his LBP and right greater than left leg pain. He states his pain began insidiously about several years ago. His pain has steadily increased since then. He rates his back pain 9/10 and right greater than left leg pain 9/10. He describes the pain as constant. Pain is worse with prolonged standing and walking and improved some with leaning forward, sitting in a straight chair, and resting. The leg pain radiates down the posterior lateral aspect of his right greater than left leg to his 8. He denies numbness and tingling in his right or left leg. He has a sense of weakness of his right greater than left leg and denies any current bowel or bladder dysfunction. The pain at times disrupts his sleep. Patient has a history of previous epidural steroid injections and his wife states that his last injection was in November 2020 with some benefit.   Pain Assessment  Location of Pain: Back  Location Modifiers: Right, Left  Severity of Pain: 8  Quality of Pain: Dull, Aching  Duration of Pain: Persistent  Frequency of Pain: Constant  Aggravating Factors: Walking, Standing  Limiting Behavior: Yes  Relieving Factors: Rest  Result of Injury: No  Work-Related Injury: No  Are there other pain locations you wish to document?: No]    Current/Past Treatment:   · Physical Therapy: None recently  · Chiropractic: None  · Injection: Several previous epidural steroid injections with varying degrees of benefit  · Medications: Neurontin    Past Medical History:   Past Medical History:   Diagnosis Date    Cancer ([facility] [address]     TESTICLE    [facility]     Hx of blood clots     Hyperlipidemia     Hypertension         Past Surgical History:     Past Surgical History:   Procedure Laterality Date    CARDIAC SURGERY      OPEN HEART    CAROTID ENDARTERECTOMY Right 2001    CORONARY ANGIOPLASTY WITH STENT PLACEMENT      PAIN MANAGEMENT PROCEDURE Right 9/3/2020    RIGHT LUMBAR THREE LUMBAR FOUR LUMBAR FIVE RADIO FREQUENCY ABLATION  SITE CONFIRMED BY FLUOROSCOPY performed by Dayne Kruse MD at [address]      [place]       Current Medications:     Current Outpatient Medications:     tamsulosin (FLOMAX) 0.4 MG capsule, , Disp: , Rfl:     gabapentin (NEURONTIN) 300 MG capsule, 1-2 po qhs, Disp: 45 capsule, Rfl: 2    metFORMIN (GLUCOPHAGE) 850 MG tablet, Take 850 mg by mouth 2 times daily (with meals), Disp: , Rfl:     oxybutynin (DITROPAN) 5 MG tablet, Take 5 mg by mouth 2 times daily, Disp: , Rfl:     lisinopril (PRINIVIL;ZESTRIL) 10 MG tablet, Take 5 mg by mouth daily, Disp: , Rfl:     omeprazole (PRILOSEC) 20 MG delayed release capsule, Take 20 mg by mouth Daily, Disp: , Rfl:     vitamin D 1000 units CAPS, Take 2,000 Units by mouth 2 times daily, Disp: , Rfl:     atorvastatin (LIPITOR) 40 MG tablet, Take 40 mg by mouth daily, Disp: , Rfl:     warfarin (COUMADIN) 5 MG tablet, Take 5 mg by mouth daily, Disp: , Rfl:     aspirin 81 MG EC tablet, Take 81 mg by mouth daily, Disp: , Rfl:     verapamil (CALAN-SR) 240 MG CR tablet, Take 120 mg by mouth nightly , Disp: , Rfl:     glimepiride (AMARYL) 4 MG tablet, Take 6 mg by mouth every morning (before breakfast) , Disp: , Rfl:     SENNOSIDES PO, Take 17.2 mg by mouth 2 times daily , Disp: , Rfl:     Allergies:  Codeine and Erythromycin    Social History:    reports that he has never smoked. He has never used smokeless tobacco. He reports that he does not drink alcohol. Family History:   History reviewed. No pertinent family history.     REVIEW OF SYSTEMS: Full ROS noted & scanned   CONSTITUTIONAL: Denies unexplained weight loss, fevers, chills or fatigue  NEUROLOGICAL: Denies unsteady gait or progressive weakness  MUSCULOSKELETAL: Denies joint swelling or redness  PSYCHOLOGICAL: Denies anxiety, depression   SKIN: Denies skin changes, delayed healing, rash, itching   HEMATOLOGIC: Denies easy bleeding or bruising  ENDOCRINE: Denies excessive thirst, urination, heat/cold  RESPIRATORY: Denies current dyspnea, cough  GI: Denies nausea, vomiting, diarrhea   : Denies bowel or bladder issues      PHYSICAL EXAM:    Vitals: Height 5' 9\" (1.753 m), weight 195 lb (88.5 kg). GENERAL EXAM:  · General Apparence: Patient is adequately groomed with no evidence of malnutrition. · Orientation: The patient is oriented to time, place and person. · Mood & Affect:The patient's mood and affect are appropriate. · Vascular: Examination reveals no swelling tenderness in upper or lower extremities. Good capillary refill. · Lymphatic: The lymphatic examination bilaterally reveals all areas to be without enlargement or induration  · Sensation: Sensation is intact without deficit  · Coordination/Balance: Adequate coordination. Tandem walking difficult due to balance and pain in his right leg    LUMBAR/SACRAL EXAMINATION:  · Inspection: Local inspection shows no step-off or bruising. Lumbar alignment is normal.  Sagittal and Coronal balance is neutral.      · Palpation:   No evidence of tenderness at the midline. No tenderness bilaterally at the paraspinal or trochanters. There is no step-off or paraspinal spasm. · Range of Motion: Lumbar flexion, extension and rotation are moderately limited due to pain especially with flexion extension. · Strength:   Strength testing is -5 /5 in all muscle groups tested. · Special Tests:   Straight leg raise and crossed SLR negative. Leg length and pelvis level. · Skin: There are no rashes, ulcerations or lesions. · Reflexes: Reflexes are symmetrically 2+ at the patellar and ankle tendons. Clonus absent bilaterally at the feet.   · Gait & station: normal, patient ambulates without assistance    · Additional Examinations:   · RIGHT LOWER EXTREMITY: Inspection/examination of the right lower extremity does not show any tenderness, deformity or injury. Range of motion is unremarkable. There is no gross instability. There are no rashes, ulcerations or lesions. Strength and tone are normal.  · LEFT LOWER EXTREMITY:  Inspection/examination of the left lower extremity does not show any tenderness, deformity or injury. Range of motion is unremarkable. There is no gross instability. There are no rashes, ulcerations or lesions. Strength and tone are normal.    Diagnostic Testing:    X-rays: 2 views of the lumbar spine to include AP and lateral were obtained in a previous office visit which reveal severe spondylosis with disc space narrowing especially L5-S1. There are no acute fractures noted. I reviewed MRI images of his lumbar spine in the office today those show a severe central stenosis L4-5 with a left paracentral disc herniation    Impression:   Severe central stenosis L4-5 left paracentral disc herniation      Plan:      We discussed diagnosis and treatment options including observation, additional oral steroids, physical therapy, epidural injections and microlumbar decompression. We discussed the risks, benefits, and alternatives to surgery including the risks of nerve or vessel injury, paralysis, spinal blood clot, spinal fluid leak, death, infection, need for additional spinal surgery, failure of surgery to alleviate his symptoms and worse symptoms following surgery. He understands these risks and wishes to proceed with surgery. His questions were answered.

## 2021-06-07 NOTE — PROGRESS NOTES
Mr. Gracie Kelley is here for management of anticoagulation for Afib. PMH also significant for HTN, HLD, DM, Hx DVT. He presents today w/out complaint. Pt verifies dosing regimen as listed above. Pt denies s/sx bleeding/bruising/swelling/SOB. No changes in RX/OTCs/Herbal medications. No dietary concerns  Denies EToH and tobacco use. He has previously been on warfarin for DVTs in the past. Was on Eliquis for a while but developed a rash so was switched back to warfarin. No changes per pt. No missed doses or diet changes. INR 1.5 is below aceptable therapeutic range of 2-3. Recommend to take 6 mg today, then increase dose to 3mg M/W/F and 4.5mg all other days. Patient has 3 mg tablets now (still some 5mg )  Recheck INR in 2 weeks  Dosing reminder card given with phone number, appointment date, and time. Return to clinic: 6/21 @ 11 am     [facility] results sent from each visit.  Fax# [phone]    Christopher Farmer PharmD 6/7/2021 9:36 AM

## 2021-06-11 ENCOUNTER — TELEPHONE (OUTPATIENT)
Dept: ORTHOPEDIC SURGERY | Age: 76
End: 2021-06-11

## 2021-06-11 NOTE — PROGRESS NOTES
Teresa Lynn Haven    Age 67 y.o.    male    1945    MRN [id number]    7/2/2021  Arrival Time_____________  OR Time____________[address]     Procedure(s): MICROLUMBAR DISCECTOMY L4-L5                      General     Surgeon(s):  Maria Elena Salt, MD      DAY ADMIT ___  SDS/OP ___  OUTPT IN BED ___         Phone [phone] (home)    PCP _____________________ Phone_________________ Epic ( ) Epic CE ( ) Appt ________    ADDITIONAL INFO __________________________________ Cardio/Consult _____________    NOTES _____________________________________________________________________    ____________________________________________________________________________    PAT APPT DATE:________ TIME: ________  FAXED QAD: _______  (__) H&P w/ hospitalist  ____________________________________________________________________________    COVID TEST: Date/Location______________        NURSING HISTORY COMPLETE: _______  (__) CBC       (__) W/ DIFF ___________  (__)  ECHO    __________  (__) Hgb A1C    ___________  (__) CHEST X RAY   __________  (__) LIPID PROFILE  ___________  (__) EKG   __________  (__) PT/PTT   ___________  (__) PFT's   __________  (__) BMP   ___________  (__) CAROTIDS  __________  (__) CMP   ___________  (__) VEIN MAPPING  __________  (__) U/A   ___________  (__) HISTORY & PHYSICAL __________  (__) URINE C & S  ___________  (__) CARDIAC CLEARANCE __________  (__) U/A W/ FLEX  ___________  (__) PULM.  CLEARANCE __________  (__) SERUM PREGNANCY ___________  (__) Check Epic DOS orders __________  (__) TYPE & SCREEN ________ repeat ( ) (__)  __________________ __________  (__) ALBUMIN   ___________  (__)  __________________ __________  (__) TRANSFERRIN  ___________  (__)  __________________ __________  (__) LIVER PROFILE  ___________  (__)  __________________ __________  (__) CARBOXY HGB  ___________  (__) URINE PREG DOS __________  (__) NICOTINE & MET.  ___________  (__) BLOOD SUGAR DOS __________  (__) PREALBUMIN  ___________    (__) MRSA NASAL SWAB ___________  (__) BLOOD THINNERS __________  (__) ACE/ ARBS: _____________________    (__) BETABLOCKERS ___________________

## 2021-06-21 ENCOUNTER — ANTI-COAG VISIT (OUTPATIENT)
Dept: PHARMACY | Age: 76
End: 2021-06-21
Payer: MEDICARE

## 2021-06-21 DIAGNOSIS — I48.19 PERSISTENT ATRIAL FIBRILLATION (HCC): Primary | ICD-10-CM

## 2021-06-21 LAB — INTERNATIONAL NORMALIZATION RATIO, POC: 2.7

## 2021-06-21 PROCEDURE — 85610 PROTHROMBIN TIME: CPT | Performed by: PHARMACIST

## 2021-06-21 PROCEDURE — 99211 OFF/OP EST MAY X REQ PHY/QHP: CPT | Performed by: PHARMACIST

## 2021-06-21 NOTE — PROGRESS NOTES
Mr. John Melo is here for management of anticoagulation for Afib. PMH also significant for HTN, HLD, DM, Hx DVT. He presents today w/out complaint. Pt verifies dosing regimen as listed above. Pt denies s/sx bleeding/bruising/swelling/SOB. No changes in RX/OTCs/Herbal medications. No dietary concerns  Denies EToH and tobacco use. He has previously been on warfarin for DVTs in the past. Was on Eliquis for a while but developed a rash so was switched back to warfarin. No changes per pt. Will have back surgery sometime soon? Not scheduled yet. INR 2.7 is within aceptable therapeutic range of 2-3. Recommend to continue dose of 3mg W/F and 4.5mg all other days. Patient has 3 mg tablets now (still some 5mg )  Recheck INR in 2 weeks  Dosing reminder card given with phone number, appointment date, and time. Return to clinic: 7/19 @ 11 am     [facility] results sent from each visit.  Fax# [phone]    Rajeev Bonilla PharmD 9:26 AM EDT 6/21/21

## 2021-06-24 NOTE — PROGRESS NOTES
Preoperative Screening for Elective Surgery/Invasive Procedures While COVID-19 present in the community     Have you had any of the following symptoms? o Fever, chills  o Cough  o Shortness of breath  o Muscle aches/pain  o Diarrhea  o Abdominal pain, nausea, vomiting  o Loss or decrease in taste and / or smell   Risk of Exposure  o Have you recently been hospitalized for COVID-19 or flu-like illness, if so when?  o Recently diagnosed with COVID-19, if so when?  o Recently tested for COVID-19, if so when?  o Have you been in close contact with a person or family member who currently has or recently had COVID-19? If yes, when and in what context?  o Do you live with anybody who in the last 14 days has had fever, chills, shortness of breath, muscle aches, flu-like illness?  o Do you have any close contacts or family members who are currently in the hospital for COVID-19 or flu-like illness? If yes, assess recent close contact with this person. Indicate if the patient has a positive screen by answering yes to one or more of the above questions. Patients who test positive or screen positive prior to surgery or on the day of surgery should be evaluated in conjunction with the surgeon/proceduralist/anesthesiologist to determine the urgency of the procedure.      Pt's wife denies all

## 2021-06-24 NOTE — PROGRESS NOTES
Obstructive Sleep Apnea (JUNAID) Screening     Patient:  Houston Roldan    YOB: 1945      Medical Record #:  [id number]                     Date:  6/24/2021     1. Are you a loud and/or regular snorer? [x]  Yes       [] No    2. Have you been observed to gasp or stop breathing during sleep? []  Yes       [x] No    3. Do you feel tired or groggy upon awakening or do you awaken with a headache?           []  Yes       [x] No    4. Are you often tired or fatigued during the wake time hours? [x]  Yes       [] No    5. Do you fall asleep sitting, reading, watching TV or driving? []  Yes       [x] No    6. Do you often have problems with memory or concentration? []  Yes       [x] No    If patient's JUNAID score if greater than or equal to 3, they are considered high risk for JUNAID. An anesthesia provider will evaluate the patient and develop a plan of care the day of surgery. Note:  If the patient's BMI is more than 35 kg m¯² , has neck circumference > 40 cm, and/or high blood pressure the risk is greater (© American [employer]nea Association, 2006).

## 2021-07-02 ENCOUNTER — ANESTHESIA (OUTPATIENT)
Dept: OPERATING ROOM | Age: 76
End: 2021-07-02
Payer: MEDICARE

## 2021-07-02 ENCOUNTER — ANESTHESIA EVENT (OUTPATIENT)
Dept: OPERATING ROOM | Age: 76
End: 2021-07-02
Payer: MEDICARE

## 2021-07-02 ENCOUNTER — APPOINTMENT (OUTPATIENT)
Dept: GENERAL RADIOLOGY | Age: 76
End: 2021-07-02
Attending: ORTHOPAEDIC SURGERY
Payer: MEDICARE

## 2021-07-02 ENCOUNTER — HOSPITAL ENCOUNTER (OUTPATIENT)
Age: 76
Setting detail: OUTPATIENT SURGERY
Discharge: HOME | End: 2021-07-02
Attending: ORTHOPAEDIC SURGERY | Admitting: ORTHOPAEDIC SURGERY
Payer: MEDICARE

## 2021-07-02 VITALS
DIASTOLIC BLOOD PRESSURE: 87 MMHG | SYSTOLIC BLOOD PRESSURE: 142 MMHG | TEMPERATURE: 96.3 F | OXYGEN SATURATION: 99 % | RESPIRATION RATE: 1 BRPM

## 2021-07-02 VITALS
OXYGEN SATURATION: 95 % | RESPIRATION RATE: 18 BRPM | DIASTOLIC BLOOD PRESSURE: 90 MMHG | HEART RATE: 82 BPM | WEIGHT: 192 LBS | SYSTOLIC BLOOD PRESSURE: 177 MMHG | HEIGHT: 69 IN | BODY MASS INDEX: 28.44 KG/M2 | TEMPERATURE: 97.4 F

## 2021-07-02 DIAGNOSIS — M48.062 SPINAL STENOSIS OF LUMBAR REGION WITH NEUROGENIC CLAUDICATION: Primary | ICD-10-CM

## 2021-07-02 LAB
EKG ATRIAL RATE: 375 BPM
EKG DIAGNOSIS: NORMAL
EKG Q-T INTERVAL: 390 MS
EKG QRS DURATION: 92 MS
EKG QTC CALCULATION (BAZETT): 447 MS
EKG R AXIS: 59 DEGREES
EKG T AXIS: 42 DEGREES
EKG VENTRICULAR RATE: 79 BPM
GLUCOSE BLD-MCNC: 164 MG/DL (ref 70–99)
GLUCOSE BLD-MCNC: 167 MG/DL (ref 70–99)
INR BLD: 1.1 (ref 0.88–1.12)
PERFORMED ON: ABNORMAL
PERFORMED ON: ABNORMAL
PROTHROMBIN TIME: 12.5 SEC (ref 9.9–12.7)

## 2021-07-02 PROCEDURE — 2500000003 HC RX 250 WO HCPCS: Performed by: NURSE ANESTHETIST, CERTIFIED REGISTERED

## 2021-07-02 PROCEDURE — 2709999900 HC NON-CHARGEABLE SUPPLY: Performed by: ORTHOPAEDIC SURGERY

## 2021-07-02 PROCEDURE — 85610 PROTHROMBIN TIME: CPT

## 2021-07-02 PROCEDURE — 3700000001 HC ADD 15 MINUTES (ANESTHESIA): Performed by: ORTHOPAEDIC SURGERY

## 2021-07-02 PROCEDURE — 7100000010 HC PHASE II RECOVERY - FIRST 15 MIN: Performed by: ORTHOPAEDIC SURGERY

## 2021-07-02 PROCEDURE — 2500000003 HC RX 250 WO HCPCS: Performed by: ORTHOPAEDIC SURGERY

## 2021-07-02 PROCEDURE — 7100000011 HC PHASE II RECOVERY - ADDTL 15 MIN: Performed by: ORTHOPAEDIC SURGERY

## 2021-07-02 PROCEDURE — 72100 X-RAY EXAM L-S SPINE 2/3 VWS: CPT

## 2021-07-02 PROCEDURE — 7100000001 HC PACU RECOVERY - ADDTL 15 MIN: Performed by: ORTHOPAEDIC SURGERY

## 2021-07-02 PROCEDURE — 2580000003 HC RX 258: Performed by: ORTHOPAEDIC SURGERY

## 2021-07-02 PROCEDURE — 6360000002 HC RX W HCPCS: Performed by: NURSE ANESTHETIST, CERTIFIED REGISTERED

## 2021-07-02 PROCEDURE — 6360000002 HC RX W HCPCS: Performed by: ORTHOPAEDIC SURGERY

## 2021-07-02 PROCEDURE — 3700000000 HC ANESTHESIA ATTENDED CARE: Performed by: ORTHOPAEDIC SURGERY

## 2021-07-02 PROCEDURE — 93010 ELECTROCARDIOGRAM REPORT: CPT | Performed by: INTERNAL MEDICINE

## 2021-07-02 PROCEDURE — 2720000010 HC SURG SUPPLY STERILE: Performed by: ORTHOPAEDIC SURGERY

## 2021-07-02 PROCEDURE — 6360000002 HC RX W HCPCS: Performed by: ANESTHESIOLOGY

## 2021-07-02 PROCEDURE — 93005 ELECTROCARDIOGRAM TRACING: CPT | Performed by: ANESTHESIOLOGY

## 2021-07-02 PROCEDURE — 7100000000 HC PACU RECOVERY - FIRST 15 MIN: Performed by: ORTHOPAEDIC SURGERY

## 2021-07-02 PROCEDURE — 2580000003 HC RX 258: Performed by: ANESTHESIOLOGY

## 2021-07-02 PROCEDURE — 3600000015 HC SURGERY LEVEL 5 ADDTL 15MIN: Performed by: ORTHOPAEDIC SURGERY

## 2021-07-02 PROCEDURE — 3600000005 HC SURGERY LEVEL 5 BASE: Performed by: ORTHOPAEDIC SURGERY

## 2021-07-02 PROCEDURE — 3209999900 FLUORO FOR SURGICAL PROCEDURES

## 2021-07-02 RX ORDER — SODIUM CHLORIDE 9 MG/ML
25 INJECTION, SOLUTION INTRAVENOUS PRN
Status: DISCONTINUED | OUTPATIENT
Start: 2021-07-02 | End: 2021-07-02 | Stop reason: HOSPADM

## 2021-07-02 RX ORDER — ONDANSETRON 2 MG/ML
4 INJECTION INTRAMUSCULAR; INTRAVENOUS
Status: DISCONTINUED | OUTPATIENT
Start: 2021-07-02 | End: 2021-07-02 | Stop reason: HOSPADM

## 2021-07-02 RX ORDER — FENTANYL CITRATE 50 UG/ML
INJECTION, SOLUTION INTRAMUSCULAR; INTRAVENOUS PRN
Status: DISCONTINUED | OUTPATIENT
Start: 2021-07-02 | End: 2021-07-02 | Stop reason: SDUPTHER

## 2021-07-02 RX ORDER — EPHEDRINE SULFATE 50 MG/ML
INJECTION INTRAVENOUS PRN
Status: DISCONTINUED | OUTPATIENT
Start: 2021-07-02 | End: 2021-07-02 | Stop reason: SDUPTHER

## 2021-07-02 RX ORDER — HYDRALAZINE HYDROCHLORIDE 20 MG/ML
5 INJECTION INTRAMUSCULAR; INTRAVENOUS EVERY 10 MIN PRN
Status: DISCONTINUED | OUTPATIENT
Start: 2021-07-02 | End: 2021-07-02 | Stop reason: HOSPADM

## 2021-07-02 RX ORDER — BUPIVACAINE HYDROCHLORIDE AND EPINEPHRINE 2.5; 5 MG/ML; UG/ML
INJECTION, SOLUTION EPIDURAL; INFILTRATION; INTRACAUDAL; PERINEURAL PRN
Status: DISCONTINUED | OUTPATIENT
Start: 2021-07-02 | End: 2021-07-02 | Stop reason: ALTCHOICE

## 2021-07-02 RX ORDER — DIPHENHYDRAMINE HYDROCHLORIDE 50 MG/ML
12.5 INJECTION INTRAMUSCULAR; INTRAVENOUS
Status: DISCONTINUED | OUTPATIENT
Start: 2021-07-02 | End: 2021-07-02 | Stop reason: HOSPADM

## 2021-07-02 RX ORDER — LABETALOL HYDROCHLORIDE 5 MG/ML
5 INJECTION, SOLUTION INTRAVENOUS EVERY 10 MIN PRN
Status: DISCONTINUED | OUTPATIENT
Start: 2021-07-02 | End: 2021-07-02 | Stop reason: HOSPADM

## 2021-07-02 RX ORDER — SODIUM CHLORIDE, SODIUM LACTATE, POTASSIUM CHLORIDE, CALCIUM CHLORIDE 600; 310; 30; 20 MG/100ML; MG/100ML; MG/100ML; MG/100ML
INJECTION, SOLUTION INTRAVENOUS CONTINUOUS
Status: DISCONTINUED | OUTPATIENT
Start: 2021-07-02 | End: 2021-07-02 | Stop reason: HOSPADM

## 2021-07-02 RX ORDER — DEXAMETHASONE SODIUM PHOSPHATE 4 MG/ML
INJECTION, SOLUTION INTRA-ARTICULAR; INTRALESIONAL; INTRAMUSCULAR; INTRAVENOUS; SOFT TISSUE PRN
Status: DISCONTINUED | OUTPATIENT
Start: 2021-07-02 | End: 2021-07-02 | Stop reason: SDUPTHER

## 2021-07-02 RX ORDER — MORPHINE SULFATE 2 MG/ML
1 INJECTION, SOLUTION INTRAMUSCULAR; INTRAVENOUS EVERY 5 MIN PRN
Status: DISCONTINUED | OUTPATIENT
Start: 2021-07-02 | End: 2021-07-02 | Stop reason: HOSPADM

## 2021-07-02 RX ORDER — SODIUM CHLORIDE 0.9 % (FLUSH) 0.9 %
5-40 SYRINGE (ML) INJECTION PRN
Status: DISCONTINUED | OUTPATIENT
Start: 2021-07-02 | End: 2021-07-02 | Stop reason: HOSPADM

## 2021-07-02 RX ORDER — LIDOCAINE HYDROCHLORIDE 20 MG/ML
INJECTION, SOLUTION INFILTRATION; PERINEURAL PRN
Status: DISCONTINUED | OUTPATIENT
Start: 2021-07-02 | End: 2021-07-02 | Stop reason: SDUPTHER

## 2021-07-02 RX ORDER — PROPOFOL 10 MG/ML
INJECTION, EMULSION INTRAVENOUS PRN
Status: DISCONTINUED | OUTPATIENT
Start: 2021-07-02 | End: 2021-07-02 | Stop reason: SDUPTHER

## 2021-07-02 RX ORDER — MORPHINE SULFATE 2 MG/ML
2 INJECTION, SOLUTION INTRAMUSCULAR; INTRAVENOUS EVERY 5 MIN PRN
Status: DISCONTINUED | OUTPATIENT
Start: 2021-07-02 | End: 2021-07-02 | Stop reason: HOSPADM

## 2021-07-02 RX ORDER — PHENYLEPHRINE HCL IN 0.9% NACL 1 MG/10 ML
SYRINGE (ML) INTRAVENOUS PRN
Status: DISCONTINUED | OUTPATIENT
Start: 2021-07-02 | End: 2021-07-02 | Stop reason: SDUPTHER

## 2021-07-02 RX ORDER — ROCURONIUM BROMIDE 10 MG/ML
INJECTION, SOLUTION INTRAVENOUS PRN
Status: DISCONTINUED | OUTPATIENT
Start: 2021-07-02 | End: 2021-07-02 | Stop reason: SDUPTHER

## 2021-07-02 RX ORDER — MEPERIDINE HYDROCHLORIDE 50 MG/ML
12.5 INJECTION INTRAMUSCULAR; INTRAVENOUS; SUBCUTANEOUS EVERY 5 MIN PRN
Status: DISCONTINUED | OUTPATIENT
Start: 2021-07-02 | End: 2021-07-02 | Stop reason: HOSPADM

## 2021-07-02 RX ORDER — ONDANSETRON 2 MG/ML
INJECTION INTRAMUSCULAR; INTRAVENOUS PRN
Status: DISCONTINUED | OUTPATIENT
Start: 2021-07-02 | End: 2021-07-02 | Stop reason: SDUPTHER

## 2021-07-02 RX ORDER — PROMETHAZINE HYDROCHLORIDE 25 MG/ML
6.25 INJECTION, SOLUTION INTRAMUSCULAR; INTRAVENOUS
Status: DISCONTINUED | OUTPATIENT
Start: 2021-07-02 | End: 2021-07-02 | Stop reason: HOSPADM

## 2021-07-02 RX ORDER — OXYCODONE HYDROCHLORIDE AND ACETAMINOPHEN 5; 325 MG/1; MG/1
2 TABLET ORAL EVERY 4 HOURS PRN
Qty: 45 TABLET | Refills: 0 | Status: SHIPPED | OUTPATIENT
Start: 2021-07-02 | End: 2021-08-02

## 2021-07-02 RX ORDER — SODIUM CHLORIDE 0.9 % (FLUSH) 0.9 %
5-40 SYRINGE (ML) INJECTION EVERY 12 HOURS SCHEDULED
Status: DISCONTINUED | OUTPATIENT
Start: 2021-07-02 | End: 2021-07-02 | Stop reason: HOSPADM

## 2021-07-02 RX ORDER — DOCUSATE SODIUM 100 MG/1
100 CAPSULE, LIQUID FILLED ORAL 2 TIMES DAILY PRN
Qty: 30 CAPSULE | Refills: 1 | Status: SHIPPED | OUTPATIENT
Start: 2021-07-02

## 2021-07-02 RX ORDER — OXYCODONE HYDROCHLORIDE AND ACETAMINOPHEN 5; 325 MG/1; MG/1
1 TABLET ORAL PRN
Status: DISCONTINUED | OUTPATIENT
Start: 2021-07-02 | End: 2021-07-02 | Stop reason: HOSPADM

## 2021-07-02 RX ORDER — LIDOCAINE HYDROCHLORIDE 10 MG/ML
0.3 INJECTION, SOLUTION EPIDURAL; INFILTRATION; INTRACAUDAL; PERINEURAL
Status: DISCONTINUED | OUTPATIENT
Start: 2021-07-02 | End: 2021-07-02 | Stop reason: HOSPADM

## 2021-07-02 RX ORDER — OXYCODONE HYDROCHLORIDE AND ACETAMINOPHEN 5; 325 MG/1; MG/1
2 TABLET ORAL PRN
Status: DISCONTINUED | OUTPATIENT
Start: 2021-07-02 | End: 2021-07-02 | Stop reason: HOSPADM

## 2021-07-02 RX ADMIN — FENTANYL CITRATE 50 MCG: 50 INJECTION INTRAMUSCULAR; INTRAVENOUS at 07:55

## 2021-07-02 RX ADMIN — ONDANSETRON 4 MG: 2 INJECTION INTRAMUSCULAR; INTRAVENOUS at 07:50

## 2021-07-02 RX ADMIN — SODIUM CHLORIDE, SODIUM LACTATE, POTASSIUM CHLORIDE, AND CALCIUM CHLORIDE: .6; .31; .03; .02 INJECTION, SOLUTION INTRAVENOUS at 07:30

## 2021-07-02 RX ADMIN — Medication 100 MCG: at 07:36

## 2021-07-02 RX ADMIN — EPHEDRINE SULFATE 5 MG: 50 INJECTION INTRAVENOUS at 08:20

## 2021-07-02 RX ADMIN — Medication 100 MCG: at 08:05

## 2021-07-02 RX ADMIN — CEFAZOLIN SODIUM 2000 MG: 10 INJECTION, POWDER, FOR SOLUTION INTRAVENOUS at 07:36

## 2021-07-02 RX ADMIN — ROCURONIUM BROMIDE 10 MG: 10 SOLUTION INTRAVENOUS at 08:25

## 2021-07-02 RX ADMIN — ROCURONIUM BROMIDE 40 MG: 10 SOLUTION INTRAVENOUS at 07:36

## 2021-07-02 RX ADMIN — HYDROMORPHONE HYDROCHLORIDE 0.5 MG: 1 INJECTION, SOLUTION INTRAMUSCULAR; INTRAVENOUS; SUBCUTANEOUS at 09:20

## 2021-07-02 RX ADMIN — Medication 100 MCG: at 08:14

## 2021-07-02 RX ADMIN — DEXAMETHASONE SODIUM PHOSPHATE 10 MG: 4 INJECTION, SOLUTION INTRAMUSCULAR; INTRAVENOUS at 07:50

## 2021-07-02 RX ADMIN — PROPOFOL 140 MG: 10 INJECTION, EMULSION INTRAVENOUS at 07:36

## 2021-07-02 RX ADMIN — SUGAMMADEX 200 MG: 100 INJECTION, SOLUTION INTRAVENOUS at 08:41

## 2021-07-02 RX ADMIN — EPHEDRINE SULFATE 5 MG: 50 INJECTION INTRAVENOUS at 08:23

## 2021-07-02 RX ADMIN — LIDOCAINE HYDROCHLORIDE 60 MG: 20 INJECTION, SOLUTION INFILTRATION; PERINEURAL at 07:36

## 2021-07-02 RX ADMIN — HYDROMORPHONE HYDROCHLORIDE 0.5 MG: 1 INJECTION, SOLUTION INTRAMUSCULAR; INTRAVENOUS; SUBCUTANEOUS at 09:05

## 2021-07-02 ASSESSMENT — PULMONARY FUNCTION TESTS
PIF_VALUE: 15
PIF_VALUE: 0
PIF_VALUE: 15
PIF_VALUE: 15
PIF_VALUE: 16
PIF_VALUE: 15
PIF_VALUE: 16
PIF_VALUE: 15
PIF_VALUE: 16
PIF_VALUE: 16
PIF_VALUE: 0
PIF_VALUE: 15
PIF_VALUE: 16
PIF_VALUE: 17
PIF_VALUE: 1
PIF_VALUE: 0
PIF_VALUE: 15
PIF_VALUE: 10
PIF_VALUE: 15
PIF_VALUE: 15
PIF_VALUE: 16
PIF_VALUE: 18
PIF_VALUE: 1
PIF_VALUE: 16
PIF_VALUE: 16
PIF_VALUE: 1
PIF_VALUE: 16
PIF_VALUE: 13
PIF_VALUE: 16
PIF_VALUE: 20
PIF_VALUE: 16
PIF_VALUE: 1
PIF_VALUE: 15
PIF_VALUE: 0
PIF_VALUE: 15
PIF_VALUE: 16
PIF_VALUE: 16
PIF_VALUE: 3
PIF_VALUE: 1
PIF_VALUE: 16
PIF_VALUE: 16
PIF_VALUE: 15
PIF_VALUE: 15
PIF_VALUE: 16
PIF_VALUE: 16
PIF_VALUE: 1
PIF_VALUE: 15
PIF_VALUE: 16
PIF_VALUE: 1
PIF_VALUE: 23
PIF_VALUE: 16
PIF_VALUE: 15
PIF_VALUE: 1
PIF_VALUE: 16
PIF_VALUE: 14
PIF_VALUE: 16
PIF_VALUE: 0
PIF_VALUE: 19
PIF_VALUE: 15
PIF_VALUE: 15
PIF_VALUE: 1
PIF_VALUE: 15
PIF_VALUE: 15
PIF_VALUE: 16
PIF_VALUE: 16
PIF_VALUE: 1
PIF_VALUE: 17
PIF_VALUE: 15
PIF_VALUE: 16
PIF_VALUE: 16
PIF_VALUE: 15
PIF_VALUE: 16
PIF_VALUE: 16

## 2021-07-02 ASSESSMENT — PAIN SCALES - GENERAL
PAINLEVEL_OUTOF10: 9
PAINLEVEL_OUTOF10: 8

## 2021-07-02 ASSESSMENT — PAIN - FUNCTIONAL ASSESSMENT: PAIN_FUNCTIONAL_ASSESSMENT: 0-10

## 2021-07-02 NOTE — PROGRESS NOTES
Pt continues to drop 02 sats to 88%. Encouraged IS and deep cough. Pt tolerated well with Sp02 up to 95%.

## 2021-07-02 NOTE — ANESTHESIA POSTPROCEDURE EVALUATION
Department of Anesthesiology  Postprocedure Note    Patient: Misha Malone  MRN: [id number]  YOB: 1945  Date of evaluation: 7/2/2021  Time:  2:22 PM     Procedure Summary     Date: 07/02/21 Room / Location: [facility]    Anesthesia Start: 0730 Anesthesia Stop: 0901    Procedure: MICROLUMBAR DISCECTOMY LUMBAR 4-5 (N/A Back) Diagnosis:       Spinal stenosis, lumbar region, without neurogenic claudication      (SPINAL STENOSIS AT L4-. L5   M48.061)    Surgeons: Hero Lay MD Responsible Provider: Phil Mark MD    Anesthesia Type: general ASA Status: 3          Anesthesia Type: general    Zane Phase I: Zane Score: 10    Zane Phase II: Zane Score: 10    Last vitals: Reviewed and per EMR flowsheets. Anesthesia Post Evaluation    Patient location during evaluation: PACU  Patient participation: complete - patient participated  Level of consciousness: awake and alert  Airway patency: patent  Nausea & Vomiting: no nausea and no vomiting  Complications: no  Cardiovascular status: blood pressure returned to baseline  Respiratory status: acceptable  Hydration status: euvolemic  Comments: VSS on transfer to phase 2 recovery. No anesthetic complications.

## 2021-07-02 NOTE — PROGRESS NOTES
O2 sats dropped to 88% on r/a after multiple times using IS successfully lto 5000ml. Encouraged pt to continue to use IS. 02 sats 97% now.

## 2021-07-02 NOTE — PROGRESS NOTES
02 sats continue to drop to mid 80's intermittently. Encouraged to use IS. Pt up to 5000ml. Currently Sp02 100%.

## 2021-07-02 NOTE — OP NOTE
Operative Note      Patient: Mark Anthony Daley  YOB: 1945  MRN: [id number]    Date of Procedure: 7/2/2021    Pre-Op Diagnosis: SPINAL STENOSIS AT L4-. L5   M48.061    Post-Op Diagnosis: Same       Procedure(s): MICROLUMBAR DISCECTOMY LUMBAR 4-5, bilateral partial facetectomy and foraminotomy    Surgeon(s):  Paxton Fraga MD    Assistant:   Surgical Assistant: Pool Foster    Anesthesia: General    Estimated Blood Loss (mL): less than 50     Complications: None    Specimens:   * No specimens in log *    Implants:  * No implants in log *      Drains: * No LDAs found *    Findings: HNP, stenosis    Detailed Description of Procedure:     INDICATIONS FOR SURGERY: Zully Campos is a 67 y.o. male with back and right greater than left leg pain. The symptoms failed to respond to conservative intervention. An MRI scan was performed and this showed evidence of severe spinal stenosis L4-5. Having failed conservative management and experiencing persistent symptoms, the patient elected to proceed ahead with the surgical option listed above. DETAILS OF PROCEDURE: The patient was brought to the operating room and placed under general anesthesia. The patient was then placed prone on a [place] table. All bony prominences were inspected and padded prior to sterile draping. Using a #10 blade knife, the skin was incised in the midline and monopolar cautery was used to dissect through the subcutaneous tissue to open the fascia and reflect the paraspinal muscles laterally exposing the L4-5 interspace on the right side. The microscope was then brought into the field and used to assist with performing a microsurgical hemilaminotomy, partial facetectomy and foraminotomy. Using the Midas Carlo drill, I burred down the hemilamina of L4 and the medial portion of the facet joint.  The underlying ligamentum flavum was freed with the micronerve hook from the underlying dura and removed with the 3 mm punch laterally, exposing the lateral dural tube and takeoff of the L5 nerve root. I carefully dissected the ligamentum flavum from the dura using a micronerve hook and removed it. I then performed a foraminotomy with a 3 mm punch. I retracted the L5 nerve root and removed disc material with a pituitary forceps. The L5 nerve root and the thecal sac were identified and noted to be without dura tears. I then made an incision in the fascia to the left of the spinous process. I then performed a right hemilaminotomy, partial facetectomy and foraminotomy at the L4-5 using the previously described method. The wound was copiously irrigated with antibiotic solution. 0.5% Marcaine in subcutaneous tissues for analgesia. The fascia was then reapproximated using interrupted 0 Vicryl sutures and interrupted 3-0 Vicryl sutures followed by a 4-0 Vicryl subcuticular suture were used to reapproximate the subcuticular layer. A sterile dressing was then applied. The patient was extubated in the operating room and transferred to the recovery room in stable condition. There were no complications. Anton Long M.D.        Electronically signed by Zachery Albright MD on 7/2/2021 at 8:43 AM

## 2021-07-02 NOTE — PROGRESS NOTES
Pt walked about 200 meters with portable pulse oximeter and maintained 02 sats in the mid 90's. Pt tolerated well with no c/o any. Returned back to [facility] with 02 sats 95%. Updated Dr Juan Antonio Osei on pt's progress and he gave the order that its ok to discharge pt but to still encourage cough and IS usage throughout the day.

## 2021-07-02 NOTE — PROGRESS NOTES
Report received from Hemanth Fitzgerald, [facility] and [place], CRNA. VSS with b/p 176/73. Sp02 97 on r/a. No airway present. Pt has his eyes closed. Will continue to monitor.

## 2021-07-02 NOTE — PROGRESS NOTES
Patient resting in stretcher with safety features in place and call light in reach.  Family at bedside

## 2021-07-02 NOTE — H&P
I have reviewed the history and physical and examined the patient and find no relevant changes. I have reviewed with the patient and/or family the risks, benefits, and alternatives to the procedure. The patient was counseled at length about the risks of cierra Covid-19 during their perioperative period and any recovery window from their procedure. The patient was made aware that cierra Covid-19  may worsen their prognosis for recovering from their procedure  and lend to a higher morbidity and/or mortality risk. All material risks, benefits, and reasonable alternatives including postponing the procedure were discussed. The patient does wish to proceed with the procedure at this time. Rachana Atkinson MD  7/2/2021 No intake/output data recorded.

## 2021-07-02 NOTE — ANESTHESIA PRE PROCEDURE
Department of Anesthesiology  Preprocedure Note       Name:  Adelso Winchester   Age:  67 y.o.  :  1945                                          MRN:  [id number]         Date:  2021      Surgeon: Liya Aiken):  Elias Mccord MD    Procedure: Procedure(s): MICROLUMBAR DISCECTOMY LUMBAR 4-5    Medications prior to admission:   Prior to Admission medications    Medication Sig Start Date End Date Taking?  Authorizing Provider   tamsulosin (FLOMAX) 0.4 MG capsule Take 0.4 mg by mouth daily  21  Yes Historical Provider, MD   metFORMIN (GLUCOPHAGE) 850 MG tablet Take 850 mg by mouth 2 times daily (with meals)   Yes Historical Provider, MD   oxybutynin (DITROPAN) 5 MG tablet Take 5 mg by mouth 2 times daily   Yes Historical Provider, MD   lisinopril (PRINIVIL;ZESTRIL) 10 MG tablet Take 5 mg by mouth daily   Yes Historical Provider, MD   omeprazole (PRILOSEC) 20 MG delayed release capsule Take 20 mg by mouth Daily   Yes Historical Provider, MD   vitamin D 1000 units CAPS Take 2,000 Units by mouth 2 times daily   Yes Historical Provider, MD   atorvastatin (LIPITOR) 40 MG tablet Take 40 mg by mouth daily   Yes Historical Provider, MD   verapamil (CALAN-SR) 240 MG CR tablet Take 120 mg by mouth nightly    Yes Historical Provider, MD   glimepiride (AMARYL) 4 MG tablet Take 6 mg by mouth every morning (before breakfast)    Yes Historical Provider, MD   gabapentin (NEURONTIN) 300 MG capsule 1-2 po qhs 2/17/20 3/16/20  Dina Neves MD   warfarin (COUMADIN) 5 MG tablet Take 5 mg by mouth daily    Historical Provider, MD   aspirin 81 MG EC tablet Take 81 mg by mouth daily    Historical Provider, MD   SENNOSIDES PO Take 17.2 mg by mouth 2 times daily     Historical Provider, MD       Current medications:    Current Facility-Administered Medications   Medication Dose Route Frequency Provider Last Rate Last Admin    ceFAZolin (ANCEF) 2000 mg in dextrose 5 % 100 mL IVPB  2,000 mg Intravenous On Call to [facility] 43 Beverly Knight MD        lactated ringers infusion   Intravenous Continuous Nadege Pérez MD        sodium chloride flush 0.9 % injection 5-40 mL  5-40 mL Intravenous 2 times per day Nadege Pérez MD        sodium chloride flush 0.9 % injection 5-40 mL  5-40 mL Intravenous PRN Nadege Pérez MD        0.9 % sodium chloride infusion  25 mL Intravenous PRN Nadege Pérez MD        lidocaine PF 1 % injection 0.3 mL  0.3 mL Intradermal Once PRN Nadege Pérez MD           Allergies: Allergies   Allergen Reactions    Apixaban Rash and Hives     Other reaction(s): Eruption of skin    Codeine Rash    Erythromycin Rash       Problem List:    Patient Active Problem List   Diagnosis Code    Persistent atrial fibrillation (HCC) I48.19    Lumbar spondylosis M47.816    Spinal stenosis of lumbar region M48.061    Lumbar facet arthropathy M47.816    Lumbar degenerative disc disease M51.36       Past Medical History:        Diagnosis Date    Atrial fibrillation [facility]     CAD (coronary artery disease)     Cancer ([facility]     TESTICLE    Diabetes mellitus ([facility] [address]     Hx of blood clots     Hyperlipidemia     Hypertension        Past Surgical History:        Procedure Laterality Date    CARDIAC SURGERY  2002    OPEN HEART; 3 vessel    CAROTID ENDARTERECTOMY Right 2001    CORONARY ANGIOPLASTY WITH STENT PLACEMENT      PAIN MANAGEMENT PROCEDURE Right 9/3/2020    RIGHT LUMBAR THREE LUMBAR FOUR LUMBAR FIVE RADIO FREQUENCY ABLATION  SITE CONFIRMED BY FLUOROSCOPY performed by Karel Potts MD at [address]      [place]       Social History:    Social History     Tobacco Use    Smoking status: Never Smoker    Smokeless tobacco: Never Used   Substance Use Topics    Alcohol use:  No                                Counseling given: Not Answered      Vital Signs (Current):   Vitals:    06/24/21 1606 07/02/21 0624   Weight: 200 lb (90.7 kg) 192 lb (87.1 kg)   Height: 5' 9\" (1.753 m) 5' 9\" (1.753 m) opening: > = 3 FB Dental:          Pulmonary:Negative Pulmonary ROS                              Cardiovascular:    (+) hypertension:, CAD:, CABG/stent (s/p PTCA):, dysrhythmias: atrial fibrillation, hyperlipidemia    (-)  angina                Neuro/Psych:               GI/Hepatic/Renal:   (+) GERD:,           Endo/Other:    (+) DiabetesType II DM, , malignancy/cancer. Abdominal:             Vascular:   + PVD, aortic or cerebral, . Other Findings:             Anesthesia Plan      general     ASA 3     (Pt agrees to risks, benefits and alternatives of GETA. Questions answered. Willing to proceed with plan.)  Induction: intravenous. Anesthetic plan and risks discussed with patient and spouse.                       Benja Garcia MD   7/2/2021

## 2021-07-15 ENCOUNTER — OFFICE VISIT (OUTPATIENT)
Dept: ORTHOPEDIC SURGERY | Age: 76
End: 2021-07-15

## 2021-07-15 VITALS — WEIGHT: 192 LBS | HEIGHT: 69 IN | BODY MASS INDEX: 28.44 KG/M2

## 2021-07-15 DIAGNOSIS — M48.061 SPINAL STENOSIS AT L4-L5 LEVEL: Primary | ICD-10-CM

## 2021-07-15 PROCEDURE — 99024 POSTOP FOLLOW-UP VISIT: CPT | Performed by: ORTHOPAEDIC SURGERY

## 2021-07-15 NOTE — PROGRESS NOTES
Mr. Diamond Nash returns today 2 weeks s/p MLD & MLL L4-L5 for low back and right greater than left leg pain. He reports improvement of his leg symptoms. His incisions show no signs of infection. He has a normal gait and 5/5 strength of his ankle DFs/PFs, bilaterally. His sensation is intact from L3 to S1 bilaterally. I cautioned him to avoid lifting more than 10 pounds, bending and impact type aerobic exercise for 8 week after surgery, then slowly increase his activity over the next month.

## 2021-07-19 ENCOUNTER — ANTI-COAG VISIT (OUTPATIENT)
Dept: PHARMACY | Age: 76
End: 2021-07-19
Payer: MEDICARE

## 2021-07-19 DIAGNOSIS — I48.19 PERSISTENT ATRIAL FIBRILLATION (HCC): Primary | ICD-10-CM

## 2021-07-19 LAB — INTERNATIONAL NORMALIZATION RATIO, POC: 1.8

## 2021-07-19 PROCEDURE — 99211 OFF/OP EST MAY X REQ PHY/QHP: CPT | Performed by: PHARMACIST

## 2021-07-19 PROCEDURE — 85610 PROTHROMBIN TIME: CPT | Performed by: PHARMACIST

## 2021-07-19 NOTE — PROGRESS NOTES
Mr. Rowan Baires is here for management of anticoagulation for Afib. PMH also significant for HTN, HLD, DM, Hx DVT. He presents today w/out complaint. Pt verifies dosing regimen as listed above. Pt denies s/sx bleeding/bruising/swelling/SOB. No changes in RX/OTCs/Herbal medications. No dietary concerns  Denies EToH and tobacco use. He has previously been on warfarin for DVTs in the past. Was on Eliquis for a while but developed a rash so was switched back to warfarin. No changes per pt. Had back surgery 7/2. was off warfarin for the week prior, started warfarin on 7/2. Was taking percocet after surgery but no longer taking. INR slightly low today. Had been in range last visit but previous 2 were also low. Will increase dose slightly today. INR 1.8 is below aceptable therapeutic range of 2-3. Recommend to increase dose to 3mg Fri and 4.5mg all other days. Patient has 3 mg tablets now (still some 5mg )  Recheck INR in 3 weeks  Dosing reminder card given with phone number, appointment date, and time. Return to clinic: 8/9 @ 11 am     [facility] results sent from each visit.  Fax# [phone]    Pepe Mcneal, PharmD 9:37 AM EDT 7/19/21

## 2021-08-09 ENCOUNTER — ANTI-COAG VISIT (OUTPATIENT)
Dept: PHARMACY | Age: 76
End: 2021-08-09
Payer: MEDICARE

## 2021-08-09 DIAGNOSIS — I48.19 PERSISTENT ATRIAL FIBRILLATION (HCC): Primary | ICD-10-CM

## 2021-08-09 LAB — INTERNATIONAL NORMALIZATION RATIO, POC: 3.9

## 2021-08-09 PROCEDURE — 99211 OFF/OP EST MAY X REQ PHY/QHP: CPT | Performed by: PHARMACIST

## 2021-08-09 PROCEDURE — 85610 PROTHROMBIN TIME: CPT | Performed by: PHARMACIST

## 2021-08-09 NOTE — PROGRESS NOTES
Mr. Angela Farr is here for management of anticoagulation for Afib. PMH also significant for HTN, HLD, DM, Hx DVT. He presents today w/out complaint. Pt verifies dosing regimen as listed above. Pt denies s/sx bleeding/bruising/swelling/SOB. No changes in RX/OTCs/Herbal medications. No dietary concerns  Denies EToH and tobacco use. He has previously been on warfarin for DVTs in the past. Was on Eliquis for a while but developed a rash so was switched back to warfarin. No changes per pt. INR now elevated after dose increase last visit. Will hold one dose and lower back down to previous schedule. INR 3.9 is above aceptable therapeutic range of 2-3. Recommend to hold dose tonight, then decrease dose to 3mg Mon/Fri and 4.5mg all other days. Patient has 3 mg tablets now (still some 5mg )  Recheck INR in 3 weeks  Dosing reminder card given with phone number, appointment date, and time. Return to clinic: 8/30 @ 11 am     [facility] results sent from each visit.  Fax# [phone]    Yeny Gsaton, PharmD 9:58 AM EDT 8/9/21

## 2021-08-30 ENCOUNTER — ANTI-COAG VISIT (OUTPATIENT)
Dept: PHARMACY | Age: 76
End: 2021-08-30
Payer: MEDICARE

## 2021-08-30 DIAGNOSIS — I48.19 PERSISTENT ATRIAL FIBRILLATION (HCC): Primary | ICD-10-CM

## 2021-08-30 LAB — INTERNATIONAL NORMALIZATION RATIO, POC: 1.6

## 2021-08-30 PROCEDURE — 99211 OFF/OP EST MAY X REQ PHY/QHP: CPT | Performed by: PHARMACIST

## 2021-08-30 PROCEDURE — 85610 PROTHROMBIN TIME: CPT | Performed by: PHARMACIST

## 2021-08-30 NOTE — PROGRESS NOTES
MrRegis Aguirre is here for management of anticoagulation for Afib. PMH also significant for HTN, HLD, DM, Hx DVT. He presents today w/out complaint. Pt verifies dosing regimen as listed above. Pt denies s/sx bleeding/bruising/swelling/SOB. No changes in RX/OTCs/Herbal medications. No dietary concerns  Denies EToH and tobacco use. He has previously been on warfarin for DVTs in the past. Was on Eliquis for a while but developed a rash so was switched back to warfarin. No changes per pt. INR now low again after dose decrease last visit. INR 1.6 is below aceptable therapeutic range of 2-3. Recommend to increase dose to 3 mg Fri and 4.5mg all other days. Patient has 3 mg tablets now (still some 5mg )  Recheck INR in 3 weeks  Dosing reminder card given with phone number, appointment date, and time. Return to clinic: 9/20 @ 11 am     [facility] results sent from each visit.  Fax# [phone]    Shila Tellez PharmD 9:50 AM EDT 8/30/21

## 2021-09-20 ENCOUNTER — ANTI-COAG VISIT (OUTPATIENT)
Dept: PHARMACY | Age: 76
End: 2021-09-20
Payer: MEDICARE

## 2021-09-20 DIAGNOSIS — I48.19 PERSISTENT ATRIAL FIBRILLATION (HCC): Primary | ICD-10-CM

## 2021-09-20 LAB — INTERNATIONAL NORMALIZATION RATIO, POC: 3.2

## 2021-09-20 PROCEDURE — 85610 PROTHROMBIN TIME: CPT | Performed by: PHARMACIST

## 2021-09-20 PROCEDURE — 99211 OFF/OP EST MAY X REQ PHY/QHP: CPT | Performed by: PHARMACIST

## 2021-10-18 ENCOUNTER — ANTI-COAG VISIT (OUTPATIENT)
Dept: PHARMACY | Age: 76
End: 2021-10-18
Payer: MEDICARE

## 2021-10-18 DIAGNOSIS — I48.19 PERSISTENT ATRIAL FIBRILLATION (HCC): Primary | ICD-10-CM

## 2021-10-18 LAB — INTERNATIONAL NORMALIZATION RATIO, POC: 1.3

## 2021-10-18 PROCEDURE — 85610 PROTHROMBIN TIME: CPT | Performed by: PHARMACIST

## 2021-10-18 PROCEDURE — 99211 OFF/OP EST MAY X REQ PHY/QHP: CPT | Performed by: PHARMACIST

## 2021-10-18 NOTE — PROGRESS NOTES
Mr. Cindy Lisa is here for management of anticoagulation for Afib. PMH also significant for HTN, HLD, DM, Hx DVT. He presents today w/out complaint. Pt verifies dosing regimen as listed above. Pt denies s/sx bleeding/bruising/swelling/SOB. No changes in RX/OTCs/Herbal medications. No dietary concerns  Denies EToH and tobacco use. He has previously been on warfarin for DVTs in the past. Was on Eliquis for a while but developed a rash so was switched back to warfarin. No changes per pt. INR now very low. Denies any missed doses. Will increase dose today. INR 1.3 is well below aceptable therapeutic range of 2-3. Recommend to increase dose to 6 mg Q Mon 4.5mg all other days. Patient has 3 mg tablets now (still some 5mg )  Recheck INR in 4 weeks  Dosing reminder card given with phone number, appointment date, and time. Return to clinic: 11/1 @ 11 am     [facility] results sent from each visit.  Fax# [phone]    Joao GonzalezD 9:52 AM EDT 10/18/21

## 2021-11-01 ENCOUNTER — ANTI-COAG VISIT (OUTPATIENT)
Dept: PHARMACY | Age: 76
End: 2021-11-01
Payer: MEDICARE

## 2021-11-01 DIAGNOSIS — I48.19 PERSISTENT ATRIAL FIBRILLATION (HCC): Primary | ICD-10-CM

## 2021-11-01 LAB — INTERNATIONAL NORMALIZATION RATIO, POC: 1.2

## 2021-11-01 PROCEDURE — 99211 OFF/OP EST MAY X REQ PHY/QHP: CPT | Performed by: PHARMACIST

## 2021-11-01 PROCEDURE — 85610 PROTHROMBIN TIME: CPT | Performed by: PHARMACIST

## 2021-11-01 RX ORDER — WARFARIN SODIUM 5 MG/1
5 TABLET ORAL DAILY
Qty: 90 TABLET | Refills: 3 | OUTPATIENT
Start: 2021-11-01 | End: 2022-01-24 | Stop reason: SDUPTHER

## 2021-11-01 NOTE — PROGRESS NOTES
Mr. Chito Phan is here for management of anticoagulation for Afib. PMH also significant for HTN, HLD, DM, Hx DVT. He presents today w/out complaint. Pt verifies dosing regimen as listed above. Pt denies s/sx bleeding/bruising/swelling/SOB. No changes in RX/OTCs/Herbal medications. No dietary concerns  Denies EToH and tobacco use. He has previously been on warfarin for DVTs in the past. Was on Eliquis for a while but developed a rash so was switched back to warfarin. No changes per pt. INR remains very low. Denies any missed doses. Will increase dose today. Unclear why INR remains so low, has had several high INR in the past on similar doses. Wants to return to 5 mg tablets. As this is a small dose increase, will try this for now. INR 1.2 is well below aceptable therapeutic range of 2-3. Recommend to increase dose to 5 mg daily   Patient has 3 mg tablets now (still some 5mg )  Recheck INR in 2 weeks  Dosing reminder card given with phone number, appointment date, and time. Return to clinic: 11/15 @ 11 am     [facility] results sent from each visit.  Fax# [phone]    Stephen García, Genevieve 9:52 AM EDT 10/18/21

## 2021-11-15 ENCOUNTER — ANTI-COAG VISIT (OUTPATIENT)
Dept: PHARMACY | Age: 76
End: 2021-11-15
Payer: MEDICARE

## 2021-11-15 DIAGNOSIS — I48.19 PERSISTENT ATRIAL FIBRILLATION (HCC): Primary | ICD-10-CM

## 2021-11-15 LAB — INTERNATIONAL NORMALIZATION RATIO, POC: 1.7

## 2021-11-15 PROCEDURE — 99211 OFF/OP EST MAY X REQ PHY/QHP: CPT | Performed by: PHARMACIST

## 2021-11-15 PROCEDURE — 85610 PROTHROMBIN TIME: CPT | Performed by: PHARMACIST

## 2021-11-15 NOTE — PROGRESS NOTES
Mr. Love Ron is here for management of anticoagulation for Afib. PMH also significant for HTN, HLD, DM, Hx DVT. He presents today w/out complaint. Pt verifies dosing regimen as listed above. Pt denies s/sx bleeding/bruising/swelling/SOB. No changes in RX/OTCs/Herbal medications. No dietary concerns  Denies EToH and tobacco use. He has previously been on warfarin for DVTs in the past. Was on Eliquis for a while but developed a rash so was switched back to warfarin. No changes per pt. INR improved today but still slightly low. Will increase dose again. INR 1.7 is well below aceptable therapeutic range of 2-3. Recommend to increase dose to 5 mg daily except 7.5 mg Q Mon. Patient has 5 mg tablets. Recheck INR in 2 weeks  Dosing reminder card given with phone number, appointment date, and time.    Return to clinic: 11/29 @ 11 am     Angela Gillette PharmD 9:41 AM EST 11/15/21

## 2021-11-29 ENCOUNTER — ANTI-COAG VISIT (OUTPATIENT)
Dept: PHARMACY | Age: 76
End: 2021-11-29
Payer: MEDICARE

## 2021-11-29 DIAGNOSIS — I48.19 PERSISTENT ATRIAL FIBRILLATION (HCC): Primary | ICD-10-CM

## 2021-11-29 LAB — INTERNATIONAL NORMALIZATION RATIO, POC: 1.8

## 2021-11-29 PROCEDURE — 85610 PROTHROMBIN TIME: CPT | Performed by: PHARMACIST

## 2021-11-29 PROCEDURE — 99211 OFF/OP EST MAY X REQ PHY/QHP: CPT | Performed by: PHARMACIST

## 2021-11-29 NOTE — PROGRESS NOTES
Mr. Mareg Ramirez is here for management of anticoagulation for Afib. PMH also significant for HTN, HLD, DM, Hx DVT. He presents today w/out complaint. Pt verifies dosing regimen as listed above. Pt denies s/sx bleeding/bruising/swelling/SOB. No changes in RX/OTCs/Herbal medications. No dietary concerns  Denies EToH and tobacco use. He has previously been on warfarin for DVTs in the past. Was on Eliquis for a while but developed a rash so was switched back to warfarin. No changes per pt. INR improved again today but still slightly low. Will increase dose again. INR 1.8 is below aceptable therapeutic range of 2-3. Recommend to increase dose to 5 mg daily except 7.5 mg Q Mon/Fri. Patient has 5 mg tablets. Recheck INR in 2 weeks  Dosing reminder card given with phone number, appointment date, and time.    Return to clinic: 12/20 @ 11 am     Joao LaraD 9:41 AM EST 11/29/21

## 2021-12-20 ENCOUNTER — ANTI-COAG VISIT (OUTPATIENT)
Dept: PHARMACY | Age: 76
End: 2021-12-20
Payer: MEDICARE

## 2021-12-20 DIAGNOSIS — I48.19 PERSISTENT ATRIAL FIBRILLATION (HCC): Primary | ICD-10-CM

## 2021-12-20 LAB — INTERNATIONAL NORMALIZATION RATIO, POC: 1.2

## 2021-12-20 PROCEDURE — 85610 PROTHROMBIN TIME: CPT | Performed by: PHARMACIST

## 2021-12-20 PROCEDURE — 99211 OFF/OP EST MAY X REQ PHY/QHP: CPT | Performed by: PHARMACIST

## 2021-12-20 NOTE — PROGRESS NOTES
Mr. Cindy Lisa is here for management of anticoagulation for Afib. PMH also significant for HTN, HLD, DM, Hx DVT. He presents today w/out complaint. Pt verifies dosing regimen as listed above. Pt denies s/sx bleeding/bruising/swelling/SOB. No changes in RX/OTCs/Herbal medications. No dietary concerns  Denies EToH and tobacco use. He has previously been on warfarin for DVTs in the past. Was on Eliquis for a while but developed a rash so was switched back to warfarin. No changes per pt. Denies any missed doses. INR had been improving but now quite low again. Unclear why INR dropped so much since last visit despite dose increase. INR 1.2 is well below aceptable therapeutic range of 2-3. Recommend to take 10 mg today, then increase dose to 7.5 mg daily except 5 mg Q Mon/Wed/Fri. Patient has 5 mg tablets. Recheck INR in 2 weeks  Dosing reminder card given with phone number, appointment date, and time.    Return to clinic: 1/3/22 @ 11 am     Jesse Burroughs PharmD 9:41 AM EST 11/29/21

## 2022-01-03 ENCOUNTER — ANTI-COAG VISIT (OUTPATIENT)
Dept: PHARMACY | Age: 77
End: 2022-01-03
Payer: MEDICARE

## 2022-01-03 DIAGNOSIS — I48.19 PERSISTENT ATRIAL FIBRILLATION (HCC): Primary | ICD-10-CM

## 2022-01-03 LAB — INTERNATIONAL NORMALIZATION RATIO, POC: 2.4

## 2022-01-03 PROCEDURE — 85610 PROTHROMBIN TIME: CPT | Performed by: PHARMACIST

## 2022-01-03 PROCEDURE — 99211 OFF/OP EST MAY X REQ PHY/QHP: CPT | Performed by: PHARMACIST

## 2022-01-03 NOTE — PROGRESS NOTES
Mr. Macey Espinosa is here for management of anticoagulation for Afib. PMH also significant for HTN, HLD, DM, Hx DVT. He presents today w/out complaint. Pt verifies dosing regimen as listed above. Pt denies s/sx bleeding/bruising/swelling/SOB. No changes in RX/OTCs/Herbal medications. No dietary concerns  Denies EToH and tobacco use. He has previously been on warfarin for DVTs in the past. Was on Eliquis for a while but developed a rash so was switched back to warfarin. No changes per pt. INR finally in range after several dose increases. INR 2.4 is within aceptable therapeutic range of 2-3. Recommend to continue 7.5 mg daily except 5 mg Q Mon/Wed/Fri. Patient has 5 mg tablets. Recheck INR in 3 weeks  Dosing reminder card given with phone number, appointment date, and time.    Return to clinic: 1/24/22 @ 11 am     Joao SwensonD 10:16 AM EST 1/3/22

## 2022-01-24 ENCOUNTER — ANTI-COAG VISIT (OUTPATIENT)
Dept: PHARMACY | Age: 77
End: 2022-01-24
Payer: MEDICARE

## 2022-01-24 DIAGNOSIS — I48.19 PERSISTENT ATRIAL FIBRILLATION (HCC): Primary | ICD-10-CM

## 2022-01-24 LAB — INTERNATIONAL NORMALIZATION RATIO, POC: 2.7

## 2022-01-24 PROCEDURE — 99211 OFF/OP EST MAY X REQ PHY/QHP: CPT | Performed by: PHARMACIST

## 2022-01-24 PROCEDURE — 85610 PROTHROMBIN TIME: CPT | Performed by: PHARMACIST

## 2022-01-24 RX ORDER — WARFARIN SODIUM 5 MG/1
TABLET ORAL
Qty: 120 TABLET | Refills: 3 | OUTPATIENT
Start: 2022-01-24

## 2022-01-24 NOTE — PROGRESS NOTES
Mr. Manuela Lees is here for management of anticoagulation for Afib. PMH also significant for HTN, HLD, DM, Hx DVT. He presents today w/out complaint. Pt verifies dosing regimen as listed above. Pt denies s/sx bleeding/bruising/swelling/SOB. No changes in RX/OTCs/Herbal medications. No dietary concerns  Denies EToH and tobacco use. He has previously been on warfarin for DVTs in the past. Was on Eliquis for a while but developed a rash so was switched back to warfarin. No changes per pt. INR finally in range after several dose increases. INR 2.4 is within aceptable therapeutic range of 2-3. Recommend to continue 7.5 mg daily except 5 mg Q Mon/Wed/Fri. Patient has 5 mg tablets. Recheck INR in 4 weeks  Dosing reminder card given with phone number, appointment date, and time.    Return to clinic: 2/21/22 @ 11 am     Mayela Marcano PharmD 10:21 AM EST 1/24/22

## 2022-02-21 ENCOUNTER — ANTI-COAG VISIT (OUTPATIENT)
Dept: PHARMACY | Age: 77
End: 2022-02-21
Payer: MEDICARE

## 2022-02-21 DIAGNOSIS — I48.19 PERSISTENT ATRIAL FIBRILLATION (HCC): Primary | ICD-10-CM

## 2022-02-21 LAB — INTERNATIONAL NORMALIZATION RATIO, POC: 3

## 2022-02-21 PROCEDURE — 99211 OFF/OP EST MAY X REQ PHY/QHP: CPT

## 2022-02-21 PROCEDURE — 85610 PROTHROMBIN TIME: CPT

## 2022-03-21 ENCOUNTER — ANTI-COAG VISIT (OUTPATIENT)
Dept: PHARMACY | Age: 77
End: 2022-03-21
Payer: MEDICARE

## 2022-03-21 DIAGNOSIS — I48.19 PERSISTENT ATRIAL FIBRILLATION (HCC): Primary | ICD-10-CM

## 2022-03-21 LAB — INTERNATIONAL NORMALIZATION RATIO, POC: 3.6

## 2022-03-21 PROCEDURE — 85610 PROTHROMBIN TIME: CPT | Performed by: PHARMACIST

## 2022-03-21 PROCEDURE — 99211 OFF/OP EST MAY X REQ PHY/QHP: CPT | Performed by: PHARMACIST

## 2022-03-21 NOTE — PROGRESS NOTES
Mr. Manuela Lees is here for management of anticoagulation for Afib. PMH also significant for HTN, HLD, DM, Hx DVT. He presents today w/out complaint. Pt verifies dosing regimen as listed above. Pt denies s/sx bleeding/bruising/swelling/SOB. No changes in RX/OTCs/Herbal medications. No dietary concerns  Denies EToH and tobacco use. He has previously been on warfarin for DVTs in the past. Was on Eliquis for a while but developed a rash so was switched back to warfarin. No changes per pt. Has not been eating as much recently. Could contribute to higher INR today. INR 3.6 is above aceptable therapeutic range of 2-3. Recommend to hold dose today, then decrease dose to 5 mg daily except 7.5 mg Q Sun/Tue/Thu.  Patient has 5 mg tablets. Recheck INR in 3 weeks  Dosing reminder card given with phone number, appointment date, and time.    Return to clinic: 4/11/22 @ 11 am     Joao LomeliD 9:57 AM EDT 3/21/22

## 2022-04-11 ENCOUNTER — ANTI-COAG VISIT (OUTPATIENT)
Dept: PHARMACY | Age: 77
End: 2022-04-11
Payer: MEDICARE

## 2022-04-11 DIAGNOSIS — I48.19 PERSISTENT ATRIAL FIBRILLATION (HCC): Primary | ICD-10-CM

## 2022-04-11 LAB — INTERNATIONAL NORMALIZATION RATIO, POC: 1.5

## 2022-04-11 PROCEDURE — 99211 OFF/OP EST MAY X REQ PHY/QHP: CPT | Performed by: PHARMACIST

## 2022-04-11 PROCEDURE — 85610 PROTHROMBIN TIME: CPT | Performed by: PHARMACIST

## 2022-04-11 NOTE — PROGRESS NOTES
Mr. Cabrera Mahmood is here for management of anticoagulation for Afib. PMH also significant for HTN, HLD, DM, Hx DVT. He presents today w/out complaint. Pt verifies dosing regimen as listed above. Pt denies s/sx bleeding/bruising/swelling/SOB. No changes in RX/OTCs/Herbal medications. No dietary concerns  Denies EToH and tobacco use. He has previously been on warfarin for DVTs in the past. Was on Eliquis for a while but developed a rash so was switched back to warfarin. No changes per pt. Did have some greens this week. INR now quite low after dose decrease last visit. Will increase back to previous dose. INR 1.5 is below aceptable therapeutic range of 2-3. Recommend to take 7.5 mg today, then increase dose to 7.5 mg daily except 5 mg Q MWF  Patient has 5 mg tablets. Recheck INR in 3 weeks  Dosing reminder card given with phone number, appointment date, and time.    Return to clinic: 5/2/22 @ 11 am     Gilberto Tom PharmD 9:55 AM EDT 4/11/22

## 2022-05-02 ENCOUNTER — ANTI-COAG VISIT (OUTPATIENT)
Dept: PHARMACY | Age: 77
End: 2022-05-02
Payer: MEDICARE

## 2022-05-02 DIAGNOSIS — I48.19 PERSISTENT ATRIAL FIBRILLATION (HCC): Primary | ICD-10-CM

## 2022-05-02 LAB — INTERNATIONAL NORMALIZATION RATIO, POC: 2

## 2022-05-02 PROCEDURE — 99211 OFF/OP EST MAY X REQ PHY/QHP: CPT | Performed by: PHARMACIST

## 2022-05-02 PROCEDURE — 85610 PROTHROMBIN TIME: CPT | Performed by: PHARMACIST

## 2022-05-02 NOTE — PROGRESS NOTES
Mr. Rowan Baires is here for management of anticoagulation for Afib. PMH also significant for HTN, HLD, DM, Hx DVT. He presents today w/out complaint. Pt verifies dosing regimen as listed above. Pt denies s/sx bleeding/bruising/swelling/SOB. No changes in RX/OTCs/Herbal medications. No dietary concerns  Denies EToH and tobacco use. He has previously been on warfarin for DVTs in the past. Was on Eliquis for a while but developed a rash so was switched back to warfarin. No changes per pt. INR 2.0 is within aceptable therapeutic range of 2-3. Recommend to continue dose of 7.5 mg daily except 5 mg Q MWF  Patient has 5 mg tablets. Recheck INR in 5 weeks as pt will be out of town. Dosing reminder card given with phone number, appointment date, and time.    Return to clinic: 6/6/22 @ 11 am     Pepe Mcneal PharmD 9:44 AM EDT 5/2/22

## 2022-06-06 ENCOUNTER — ANTI-COAG VISIT (OUTPATIENT)
Dept: PHARMACY | Age: 77
End: 2022-06-06
Payer: MEDICARE

## 2022-06-06 DIAGNOSIS — I48.19 PERSISTENT ATRIAL FIBRILLATION (HCC): Primary | ICD-10-CM

## 2022-06-06 LAB — INR BLD: 2.6

## 2022-06-06 PROCEDURE — 85610 PROTHROMBIN TIME: CPT | Performed by: FAMILY MEDICINE

## 2022-06-06 PROCEDURE — 99211 OFF/OP EST MAY X REQ PHY/QHP: CPT | Performed by: FAMILY MEDICINE

## 2022-06-06 NOTE — PROGRESS NOTES
MrRegis Alberto is here for management of anticoagulation for Afib. PMH also significant for HTN, HLD, DM, Hx DVT. He presents today w/out complaint. Pt verifies dosing regimen as listed above. Pt denies s/sx bleeding/bruising/swelling/SOB. No changes in RX/OTCs/Herbal medications. No dietary concerns  Denies EToH and tobacco use. He has previously been on warfarin for DVTs in the past. Was on Eliquis for a while but developed a rash so was switched back to warfarin. No changes per pt. INR 2.6 is within aceptable therapeutic range of 2-3. Recommend to continue dose of 7.5 mg daily except 5 mg Q MWF  Patient has 5 mg tablets. Recheck INR in 5 weeks   Dosing reminder card given with phone number, appointment date, and time.    Return to clinic: 7/11 @ 11 am     Cinda Arvizu, PharmD 6/6/2022 9:59 AM

## 2022-07-11 ENCOUNTER — ANTI-COAG VISIT (OUTPATIENT)
Dept: PHARMACY | Age: 77
End: 2022-07-11
Payer: MEDICARE

## 2022-07-11 DIAGNOSIS — I48.19 PERSISTENT ATRIAL FIBRILLATION (HCC): Primary | ICD-10-CM

## 2022-07-11 LAB — INTERNATIONAL NORMALIZATION RATIO, POC: 1.1

## 2022-07-11 PROCEDURE — 99211 OFF/OP EST MAY X REQ PHY/QHP: CPT | Performed by: PHARMACIST

## 2022-07-11 PROCEDURE — 85610 PROTHROMBIN TIME: CPT | Performed by: PHARMACIST

## 2022-07-11 RX ORDER — WARFARIN SODIUM 5 MG/1
TABLET ORAL
Qty: 135 TABLET | Refills: 3 | OUTPATIENT
Start: 2022-07-11

## 2022-08-01 ENCOUNTER — ANTI-COAG VISIT (OUTPATIENT)
Dept: PHARMACY | Age: 77
End: 2022-08-01
Payer: MEDICARE

## 2022-08-01 DIAGNOSIS — I48.19 PERSISTENT ATRIAL FIBRILLATION (HCC): Primary | ICD-10-CM

## 2022-08-01 LAB — INTERNATIONAL NORMALIZATION RATIO, POC: 2.5

## 2022-08-01 PROCEDURE — 85610 PROTHROMBIN TIME: CPT | Performed by: PHARMACIST

## 2022-08-01 PROCEDURE — 99211 OFF/OP EST MAY X REQ PHY/QHP: CPT | Performed by: PHARMACIST

## 2022-08-01 NOTE — PROGRESS NOTES
Mr. Radha Monte is here for management of anticoagulation for Afib. PMH also significant for HTN, HLD, DM, Hx DVT. He presents today w/out complaint. Pt verifies dosing regimen as listed above. Pt denies s/sx bleeding/bruising/swelling/SOB. No changes in RX/OTCs/Herbal medications. No dietary concerns  Denies EToH and tobacco use. He has previously been on warfarin for DVTs in the past. Was on Eliquis for a while but developed a rash so was switched back to warfarin. INR back in range today, likely low last visit due to missed doses. Has otherwise been steady last few months. INR 2.5 is within aceptable therapeutic range of 2-3. Recommend to continue dose of 7.5 mg daily except 5 mg Q MWF  Patient has 5 mg tablets. Recheck INR in 4 weeks   Dosing reminder card given with phone number, appointment date, and time.    Return to clinic: 8/29/22 @ 11 am     Fernie Retana PharmD 9:44 AM EDT 8/1/22

## 2022-08-29 ENCOUNTER — ANTI-COAG VISIT (OUTPATIENT)
Dept: PHARMACY | Age: 77
End: 2022-08-29
Payer: MEDICARE

## 2022-08-29 DIAGNOSIS — I48.19 PERSISTENT ATRIAL FIBRILLATION (HCC): Primary | ICD-10-CM

## 2022-08-29 LAB — INTERNATIONAL NORMALIZATION RATIO, POC: 1.9

## 2022-08-29 PROCEDURE — 99211 OFF/OP EST MAY X REQ PHY/QHP: CPT | Performed by: PHARMACIST

## 2022-08-29 PROCEDURE — 85610 PROTHROMBIN TIME: CPT | Performed by: PHARMACIST

## 2022-08-29 NOTE — PROGRESS NOTES
MrRegis Huynh is here for management of anticoagulation for Afib. PMH also significant for HTN, HLD, DM, Hx DVT. He presents today w/out complaint. Pt verifies dosing regimen as listed above. Pt denies s/sx bleeding/bruising/swelling/SOB. No changes in RX/OTCs/Herbal medications. No dietary concerns  Denies EToH and tobacco use. He has previously been on warfarin for DVTs in the past. Was on Eliquis for a while but developed a rash so was switched back to warfarin. No changes per pt. INR 1.9 is within aceptable therapeutic range of 2-3. Recommend to continue dose of 7.5 mg daily except 5 mg Q MWF  Patient has 5 mg tablets. Recheck INR in 4 weeks   Dosing reminder card given with phone number, appointment date, and time.    Return to clinic: 9/26/22 @ 11 am     Joao HerronD 9:50 AM EDT 8/29/22

## 2023-03-08 ENCOUNTER — ANTI-COAG VISIT (OUTPATIENT)
Dept: PHARMACY | Age: 78
End: 2023-03-08

## 2025-08-13 ENCOUNTER — APPOINTMENT (OUTPATIENT)
Dept: CT IMAGING | Age: 80
End: 2025-08-13
Attending: EMERGENCY MEDICINE
Payer: MEDICARE

## 2025-08-13 ENCOUNTER — APPOINTMENT (OUTPATIENT)
Dept: GENERAL RADIOLOGY | Age: 80
End: 2025-08-13
Attending: EMERGENCY MEDICINE
Payer: MEDICARE

## 2025-08-13 ENCOUNTER — HOSPITAL ENCOUNTER (EMERGENCY)
Age: 80
Discharge: ANOTHER HOSPITAL | End: 2025-08-14
Attending: EMERGENCY MEDICINE
Payer: MEDICARE

## 2025-08-13 DIAGNOSIS — Z91.81 HISTORY OF RECENT FALL: ICD-10-CM

## 2025-08-13 DIAGNOSIS — T07.XXXA MULTIPLE CONTUSIONS: ICD-10-CM

## 2025-08-13 DIAGNOSIS — N17.9 AKI (ACUTE KIDNEY INJURY): ICD-10-CM

## 2025-08-13 DIAGNOSIS — I95.1 ORTHOSTATIC HYPOTENSION: Primary | ICD-10-CM

## 2025-08-13 DIAGNOSIS — R79.89 ELEVATED TROPONIN: ICD-10-CM

## 2025-08-13 DIAGNOSIS — R74.8 ELEVATED LIPASE: ICD-10-CM

## 2025-08-13 LAB
ALBUMIN SERPL-MCNC: 4.2 G/DL (ref 3.4–5)
ALBUMIN/GLOB SERPL: 1.3 {RATIO} (ref 1.1–2.2)
ALP SERPL-CCNC: 99 U/L (ref 40–129)
ALT SERPL-CCNC: 12 U/L (ref 10–40)
AMMONIA PLAS-SCNC: 29 UMOL/L (ref 16–60)
AMPHETAMINES UR QL SCN>1000 NG/ML: NORMAL
ANION GAP SERPL CALCULATED.3IONS-SCNC: 15 MMOL/L (ref 3–16)
AST SERPL-CCNC: 18 U/L (ref 15–37)
BACTERIA URNS QL MICRO: ABNORMAL /HPF
BARBITURATES UR QL SCN>200 NG/ML: NORMAL
BASE EXCESS BLDV CALC-SCNC: -5.3 MMOL/L (ref -3–3)
BASOPHILS # BLD: 0.1 K/UL (ref 0–0.2)
BASOPHILS NFR BLD: 0.9 %
BENZODIAZ UR QL SCN>200 NG/ML: NORMAL
BILIRUB SERPL-MCNC: 0.9 MG/DL (ref 0–1)
BUN SERPL-MCNC: 60 MG/DL (ref 7–20)
CALCIUM SERPL-MCNC: 9.5 MG/DL (ref 8.3–10.6)
CANNABINOIDS UR QL SCN>50 NG/ML: NORMAL
CHLORIDE SERPL-SCNC: 97 MMOL/L (ref 99–110)
CO2 BLDV-SCNC: 23 MMOL/L
CO2 SERPL-SCNC: 21 MMOL/L (ref 21–32)
COCAINE UR QL SCN: NORMAL
COHGB MFR BLDV: 1.7 % (ref 0–1.5)
CREAT SERPL-MCNC: 3 MG/DL (ref 0.8–1.3)
DEPRECATED RDW RBC AUTO: 14.2 % (ref 12.4–15.4)
DRUG SCREEN COMMENT UR-IMP: NORMAL
EOSINOPHIL # BLD: 0.3 K/UL (ref 0–0.6)
EOSINOPHIL NFR BLD: 3 %
EPI CELLS #/AREA URNS HPF: ABNORMAL /HPF (ref 0–5)
ETHANOLAMINE SERPL-MCNC: NORMAL MG/DL (ref 0–0.08)
FENTANYL SCREEN, URINE: NORMAL
GFR SERPLBLD CREATININE-BSD FMLA CKD-EPI: 20 ML/MIN/{1.73_M2}
GLUCOSE SERPL-MCNC: 157 MG/DL (ref 70–99)
HCO3 BLDV-SCNC: 21.3 MMOL/L (ref 23–29)
HCT VFR BLD AUTO: 47.3 % (ref 40.5–52.5)
HGB BLD-MCNC: 16.1 G/DL (ref 13.5–17.5)
INR PPP: 1.16 (ref 0.86–1.14)
LACTATE BLDV-SCNC: 1.7 MMOL/L (ref 0.4–2)
LIPASE SERPL-CCNC: 390 U/L (ref 13–60)
LYMPHOCYTES # BLD: 2 K/UL (ref 1–5.1)
LYMPHOCYTES NFR BLD: 23.8 %
MCH RBC QN AUTO: 28.6 PG (ref 26–34)
MCHC RBC AUTO-ENTMCNC: 34 G/DL (ref 31–36)
MCV RBC AUTO: 84.1 FL (ref 80–100)
METHADONE UR QL SCN>300 NG/ML: NORMAL
METHGB MFR BLDV: 0.3 %
MONOCYTES # BLD: 0.5 K/UL (ref 0–1.3)
MONOCYTES NFR BLD: 5.7 %
NEUTROPHILS # BLD: 5.7 K/UL (ref 1.7–7.7)
NEUTROPHILS NFR BLD: 66.6 %
O2 THERAPY: ABNORMAL
OPIATES UR QL SCN>300 NG/ML: NORMAL
OXYCODONE UR QL SCN: NORMAL
PCO2 BLDV: 44.8 MMHG (ref 40–50)
PCP UR QL SCN>25 NG/ML: NORMAL
PH BLDV: 7.29 [PH] (ref 7.35–7.45)
PH UR STRIP: 5.5 [PH]
PLATELET # BLD AUTO: 241 K/UL (ref 135–450)
PMV BLD AUTO: 8.2 FL (ref 5–10.5)
PO2 BLDV: 22 MMHG (ref 25–40)
POTASSIUM SERPL-SCNC: 4.2 MMOL/L (ref 3.5–5.1)
PROT SERPL-MCNC: 7.4 G/DL (ref 6.4–8.2)
PROTHROMBIN TIME: 15.1 SEC (ref 12.1–14.9)
RBC # BLD AUTO: 5.63 M/UL (ref 4.2–5.9)
RBC #/AREA URNS HPF: ABNORMAL /HPF (ref 0–4)
SAO2 % BLDV: 32 %
SODIUM SERPL-SCNC: 133 MMOL/L (ref 136–145)
TROPONIN, HIGH SENSITIVITY: 37 NG/L (ref 0–22)
WBC # BLD AUTO: 8.6 K/UL (ref 4–11)
WBC #/AREA URNS HPF: >100 /HPF (ref 0–5)

## 2025-08-13 PROCEDURE — 82077 ASSAY SPEC XCP UR&BREATH IA: CPT

## 2025-08-13 PROCEDURE — 81001 URINALYSIS AUTO W/SCOPE: CPT

## 2025-08-13 PROCEDURE — 82803 BLOOD GASES ANY COMBINATION: CPT

## 2025-08-13 PROCEDURE — 96361 HYDRATE IV INFUSION ADD-ON: CPT

## 2025-08-13 PROCEDURE — 74176 CT ABD & PELVIS W/O CONTRAST: CPT

## 2025-08-13 PROCEDURE — 71045 X-RAY EXAM CHEST 1 VIEW: CPT

## 2025-08-13 PROCEDURE — 85610 PROTHROMBIN TIME: CPT

## 2025-08-13 PROCEDURE — 93005 ELECTROCARDIOGRAM TRACING: CPT | Performed by: EMERGENCY MEDICINE

## 2025-08-13 PROCEDURE — 99285 EMERGENCY DEPT VISIT HI MDM: CPT

## 2025-08-13 PROCEDURE — 96375 TX/PRO/DX INJ NEW DRUG ADDON: CPT

## 2025-08-13 PROCEDURE — 80307 DRUG TEST PRSMV CHEM ANLYZR: CPT

## 2025-08-13 PROCEDURE — 84484 ASSAY OF TROPONIN QUANT: CPT

## 2025-08-13 PROCEDURE — 80053 COMPREHEN METABOLIC PANEL: CPT

## 2025-08-13 PROCEDURE — 82140 ASSAY OF AMMONIA: CPT

## 2025-08-13 PROCEDURE — 6360000002 HC RX W HCPCS: Performed by: EMERGENCY MEDICINE

## 2025-08-13 PROCEDURE — 2580000003 HC RX 258: Performed by: EMERGENCY MEDICINE

## 2025-08-13 PROCEDURE — 70450 CT HEAD/BRAIN W/O DYE: CPT

## 2025-08-13 PROCEDURE — 83690 ASSAY OF LIPASE: CPT

## 2025-08-13 PROCEDURE — 83605 ASSAY OF LACTIC ACID: CPT

## 2025-08-13 PROCEDURE — 72170 X-RAY EXAM OF PELVIS: CPT

## 2025-08-13 PROCEDURE — 87086 URINE CULTURE/COLONY COUNT: CPT

## 2025-08-13 PROCEDURE — 72125 CT NECK SPINE W/O DYE: CPT

## 2025-08-13 PROCEDURE — 84443 ASSAY THYROID STIM HORMONE: CPT

## 2025-08-13 PROCEDURE — 85025 COMPLETE CBC W/AUTO DIFF WBC: CPT

## 2025-08-13 PROCEDURE — 36415 COLL VENOUS BLD VENIPUNCTURE: CPT

## 2025-08-13 RX ORDER — 0.9 % SODIUM CHLORIDE 0.9 %
1000 INTRAVENOUS SOLUTION INTRAVENOUS ONCE
Status: COMPLETED | OUTPATIENT
Start: 2025-08-13 | End: 2025-08-13

## 2025-08-13 RX ORDER — PANTOPRAZOLE SODIUM 40 MG/10ML
40 INJECTION, POWDER, LYOPHILIZED, FOR SOLUTION INTRAVENOUS ONCE
Status: COMPLETED | OUTPATIENT
Start: 2025-08-13 | End: 2025-08-13

## 2025-08-13 RX ORDER — GLIPIZIDE 5 MG/1
5 TABLET ORAL DAILY
Status: ON HOLD | COMMUNITY
End: 2025-08-17

## 2025-08-13 RX ORDER — SPIRONOLACTONE 50 MG/1
50 TABLET, FILM COATED ORAL DAILY
COMMUNITY

## 2025-08-13 RX ORDER — TRAZODONE HYDROCHLORIDE 50 MG/1
50 TABLET ORAL NIGHTLY
Status: ON HOLD | COMMUNITY
End: 2025-08-17

## 2025-08-13 RX ORDER — VALSARTAN 160 MG/1
160 TABLET ORAL DAILY
Status: ON HOLD | COMMUNITY
End: 2025-08-17

## 2025-08-13 RX ADMIN — PANTOPRAZOLE SODIUM 40 MG: 40 INJECTION, POWDER, LYOPHILIZED, FOR SOLUTION INTRAVENOUS at 23:36

## 2025-08-13 RX ADMIN — SODIUM CHLORIDE 1000 ML: 0.9 INJECTION, SOLUTION INTRAVENOUS at 23:09

## 2025-08-13 ASSESSMENT — PAIN DESCRIPTION - FREQUENCY: FREQUENCY: CONTINUOUS

## 2025-08-13 ASSESSMENT — PAIN DESCRIPTION - DESCRIPTORS: DESCRIPTORS: PATIENT UNABLE TO DESCRIBE

## 2025-08-13 ASSESSMENT — PAIN - FUNCTIONAL ASSESSMENT: PAIN_FUNCTIONAL_ASSESSMENT: 0-10

## 2025-08-13 ASSESSMENT — PAIN DESCRIPTION - ORIENTATION: ORIENTATION: LEFT

## 2025-08-13 ASSESSMENT — PAIN DESCRIPTION - PAIN TYPE: TYPE: ACUTE PAIN

## 2025-08-14 ENCOUNTER — HOSPITAL ENCOUNTER (INPATIENT)
Age: 80
LOS: 3 days | Discharge: HOME HEALTH | DRG: 690 | End: 2025-08-17
Attending: HOSPITALIST | Admitting: HOSPITALIST
Payer: MEDICARE

## 2025-08-14 VITALS
OXYGEN SATURATION: 98 % | DIASTOLIC BLOOD PRESSURE: 64 MMHG | WEIGHT: 200 LBS | SYSTOLIC BLOOD PRESSURE: 142 MMHG | TEMPERATURE: 96.4 F | HEIGHT: 72 IN | BODY MASS INDEX: 27.09 KG/M2 | HEART RATE: 64 BPM | RESPIRATION RATE: 15 BRPM

## 2025-08-14 PROBLEM — N39.0 UTI (URINARY TRACT INFECTION): Status: ACTIVE | Noted: 2025-08-14

## 2025-08-14 LAB
ANION GAP SERPL CALCULATED.3IONS-SCNC: 11 MMOL/L (ref 3–16)
BASOPHILS # BLD: 0.1 K/UL (ref 0–0.2)
BASOPHILS NFR BLD: 0.7 %
BILIRUB UR QL STRIP.AUTO: ABNORMAL
BUN SERPL-MCNC: 54 MG/DL (ref 7–20)
CALCIUM SERPL-MCNC: 8.7 MG/DL (ref 8.3–10.6)
CHLORIDE SERPL-SCNC: 104 MMOL/L (ref 99–110)
CLARITY UR: ABNORMAL
CO2 SERPL-SCNC: 20 MMOL/L (ref 21–32)
COLOR UR: YELLOW
CREAT SERPL-MCNC: 2.5 MG/DL (ref 0.8–1.3)
DEPRECATED RDW RBC AUTO: 14.4 % (ref 12.4–15.4)
EKG DIAGNOSIS: NORMAL
EKG Q-T INTERVAL: 406 MS
EKG QRS DURATION: 102 MS
EKG QTC CALCULATION (BAZETT): 418 MS
EKG R AXIS: 80 DEGREES
EKG T AXIS: 6 DEGREES
EKG VENTRICULAR RATE: 64 BPM
EOSINOPHIL # BLD: 0.3 K/UL (ref 0–0.6)
EOSINOPHIL NFR BLD: 3.8 %
GFR SERPLBLD CREATININE-BSD FMLA CKD-EPI: 25 ML/MIN/{1.73_M2}
GLUCOSE SERPL-MCNC: 115 MG/DL (ref 70–99)
GLUCOSE UR STRIP.AUTO-MCNC: 250 MG/DL
HCT VFR BLD AUTO: 41.4 % (ref 40.5–52.5)
HGB BLD-MCNC: 14 G/DL (ref 13.5–17.5)
HGB UR QL STRIP.AUTO: ABNORMAL
KETONES UR STRIP.AUTO-MCNC: ABNORMAL MG/DL
LEUKOCYTE ESTERASE UR QL STRIP.AUTO: ABNORMAL
LYMPHOCYTES # BLD: 1.8 K/UL (ref 1–5.1)
LYMPHOCYTES NFR BLD: 22.5 %
MCH RBC QN AUTO: 28.5 PG (ref 26–34)
MCHC RBC AUTO-ENTMCNC: 33.8 G/DL (ref 31–36)
MCV RBC AUTO: 84.3 FL (ref 80–100)
MONOCYTES # BLD: 0.5 K/UL (ref 0–1.3)
MONOCYTES NFR BLD: 6.4 %
NEUTROPHILS # BLD: 5.5 K/UL (ref 1.7–7.7)
NEUTROPHILS NFR BLD: 66.6 %
NITRITE UR QL STRIP.AUTO: NEGATIVE
PH UR STRIP.AUTO: 5.5 [PH] (ref 5–8)
PLATELET # BLD AUTO: 214 K/UL (ref 135–450)
PMV BLD AUTO: 7.9 FL (ref 5–10.5)
POTASSIUM SERPL-SCNC: 3.6 MMOL/L (ref 3.5–5.1)
PROT UR STRIP.AUTO-MCNC: ABNORMAL MG/DL
RBC # BLD AUTO: 4.91 M/UL (ref 4.2–5.9)
SODIUM SERPL-SCNC: 135 MMOL/L (ref 136–145)
SP GR UR STRIP.AUTO: 1.02 (ref 1–1.03)
TROPONIN, HIGH SENSITIVITY: 31 NG/L (ref 0–22)
TSH SERPL DL<=0.005 MIU/L-ACNC: 3.2 UIU/ML (ref 0.27–4.2)
UA COMPLETE W REFLEX CULTURE PNL UR: YES
UA DIPSTICK W REFLEX MICRO PNL UR: YES
URN SPEC COLLECT METH UR: ABNORMAL
UROBILINOGEN UR STRIP-ACNC: 0.2 E.U./DL
WBC # BLD AUTO: 8.2 K/UL (ref 4–11)

## 2025-08-14 PROCEDURE — 84484 ASSAY OF TROPONIN QUANT: CPT

## 2025-08-14 PROCEDURE — 2580000003 HC RX 258: Performed by: NURSE PRACTITIONER

## 2025-08-14 PROCEDURE — 6370000000 HC RX 637 (ALT 250 FOR IP): Performed by: INTERNAL MEDICINE

## 2025-08-14 PROCEDURE — 97535 SELF CARE MNGMENT TRAINING: CPT

## 2025-08-14 PROCEDURE — 93010 ELECTROCARDIOGRAM REPORT: CPT | Performed by: INTERNAL MEDICINE

## 2025-08-14 PROCEDURE — 6360000002 HC RX W HCPCS: Performed by: EMERGENCY MEDICINE

## 2025-08-14 PROCEDURE — 97530 THERAPEUTIC ACTIVITIES: CPT

## 2025-08-14 PROCEDURE — 97165 OT EVAL LOW COMPLEX 30 MIN: CPT

## 2025-08-14 PROCEDURE — 36415 COLL VENOUS BLD VENIPUNCTURE: CPT

## 2025-08-14 PROCEDURE — 97162 PT EVAL MOD COMPLEX 30 MIN: CPT

## 2025-08-14 PROCEDURE — 97116 GAIT TRAINING THERAPY: CPT

## 2025-08-14 PROCEDURE — 6370000000 HC RX 637 (ALT 250 FOR IP): Performed by: NURSE PRACTITIONER

## 2025-08-14 PROCEDURE — 80048 BASIC METABOLIC PNL TOTAL CA: CPT

## 2025-08-14 PROCEDURE — 1200000000 HC SEMI PRIVATE

## 2025-08-14 PROCEDURE — 2500000003 HC RX 250 WO HCPCS: Performed by: NURSE PRACTITIONER

## 2025-08-14 PROCEDURE — 96365 THER/PROPH/DIAG IV INF INIT: CPT

## 2025-08-14 PROCEDURE — 85025 COMPLETE CBC W/AUTO DIFF WBC: CPT

## 2025-08-14 PROCEDURE — 2580000003 HC RX 258: Performed by: EMERGENCY MEDICINE

## 2025-08-14 RX ORDER — SODIUM CHLORIDE 0.9 % (FLUSH) 0.9 %
5-40 SYRINGE (ML) INJECTION EVERY 12 HOURS SCHEDULED
Status: DISCONTINUED | OUTPATIENT
Start: 2025-08-14 | End: 2025-08-17 | Stop reason: HOSPADM

## 2025-08-14 RX ORDER — GABAPENTIN 300 MG/1
300 CAPSULE ORAL NIGHTLY
Status: DISCONTINUED | OUTPATIENT
Start: 2025-08-14 | End: 2025-08-17 | Stop reason: HOSPADM

## 2025-08-14 RX ORDER — SODIUM CHLORIDE 9 MG/ML
INJECTION, SOLUTION INTRAVENOUS CONTINUOUS
Status: ACTIVE | OUTPATIENT
Start: 2025-08-14 | End: 2025-08-15

## 2025-08-14 RX ORDER — SODIUM CHLORIDE 9 MG/ML
INJECTION, SOLUTION INTRAVENOUS PRN
Status: DISCONTINUED | OUTPATIENT
Start: 2025-08-14 | End: 2025-08-17 | Stop reason: HOSPADM

## 2025-08-14 RX ORDER — ACETAMINOPHEN 325 MG/1
650 TABLET ORAL EVERY 6 HOURS PRN
Status: DISCONTINUED | OUTPATIENT
Start: 2025-08-14 | End: 2025-08-17 | Stop reason: HOSPADM

## 2025-08-14 RX ORDER — SPIRONOLACTONE 25 MG/1
50 TABLET ORAL DAILY
Status: DISCONTINUED | OUTPATIENT
Start: 2025-08-14 | End: 2025-08-17 | Stop reason: HOSPADM

## 2025-08-14 RX ORDER — ATORVASTATIN CALCIUM 80 MG/1
80 TABLET, FILM COATED ORAL NIGHTLY
Status: DISCONTINUED | OUTPATIENT
Start: 2025-08-14 | End: 2025-08-17 | Stop reason: HOSPADM

## 2025-08-14 RX ORDER — VALSARTAN 80 MG/1
160 TABLET ORAL DAILY
Status: DISCONTINUED | OUTPATIENT
Start: 2025-08-14 | End: 2025-08-17 | Stop reason: HOSPADM

## 2025-08-14 RX ORDER — ACETAMINOPHEN 650 MG/1
650 SUPPOSITORY RECTAL EVERY 6 HOURS PRN
Status: DISCONTINUED | OUTPATIENT
Start: 2025-08-14 | End: 2025-08-17 | Stop reason: HOSPADM

## 2025-08-14 RX ORDER — SODIUM CHLORIDE 0.9 % (FLUSH) 0.9 %
5-40 SYRINGE (ML) INJECTION PRN
Status: DISCONTINUED | OUTPATIENT
Start: 2025-08-14 | End: 2025-08-17 | Stop reason: HOSPADM

## 2025-08-14 RX ORDER — POLYETHYLENE GLYCOL 3350 17 G/17G
17 POWDER, FOR SOLUTION ORAL DAILY PRN
Status: DISCONTINUED | OUTPATIENT
Start: 2025-08-14 | End: 2025-08-17 | Stop reason: HOSPADM

## 2025-08-14 RX ORDER — ONDANSETRON 4 MG/1
4 TABLET, ORALLY DISINTEGRATING ORAL EVERY 8 HOURS PRN
Status: DISCONTINUED | OUTPATIENT
Start: 2025-08-14 | End: 2025-08-17 | Stop reason: HOSPADM

## 2025-08-14 RX ORDER — TRAZODONE HYDROCHLORIDE 50 MG/1
50 TABLET ORAL NIGHTLY
Status: DISCONTINUED | OUTPATIENT
Start: 2025-08-14 | End: 2025-08-17 | Stop reason: HOSPADM

## 2025-08-14 RX ORDER — ONDANSETRON 2 MG/ML
4 INJECTION INTRAMUSCULAR; INTRAVENOUS EVERY 6 HOURS PRN
Status: DISCONTINUED | OUTPATIENT
Start: 2025-08-14 | End: 2025-08-17 | Stop reason: HOSPADM

## 2025-08-14 RX ADMIN — SODIUM CHLORIDE: 0.9 INJECTION, SOLUTION INTRAVENOUS at 04:40

## 2025-08-14 RX ADMIN — SPIRONOLACTONE 50 MG: 25 TABLET, FILM COATED ORAL at 08:55

## 2025-08-14 RX ADMIN — APIXABAN 2.5 MG: 2.5 TABLET, FILM COATED ORAL at 20:25

## 2025-08-14 RX ADMIN — ACETAMINOPHEN 650 MG: 325 TABLET ORAL at 20:25

## 2025-08-14 RX ADMIN — GABAPENTIN 300 MG: 300 CAPSULE ORAL at 20:25

## 2025-08-14 RX ADMIN — TRAZODONE HYDROCHLORIDE 50 MG: 50 TABLET ORAL at 20:25

## 2025-08-14 RX ADMIN — SODIUM CHLORIDE, PRESERVATIVE FREE 10 ML: 5 INJECTION INTRAVENOUS at 08:56

## 2025-08-14 RX ADMIN — ATORVASTATIN CALCIUM 80 MG: 80 TABLET, FILM COATED ORAL at 20:25

## 2025-08-14 RX ADMIN — APIXABAN 5 MG: 5 TABLET, FILM COATED ORAL at 08:55

## 2025-08-14 RX ADMIN — SODIUM CHLORIDE 1000 MG: 9 INJECTION, SOLUTION INTRAVENOUS at 00:39

## 2025-08-14 RX ADMIN — VALSARTAN 160 MG: 80 TABLET, FILM COATED ORAL at 08:56

## 2025-08-14 ASSESSMENT — PAIN SCALES - GENERAL
PAINLEVEL_OUTOF10: 0
PAINLEVEL_OUTOF10: 3

## 2025-08-15 LAB
ANION GAP SERPL CALCULATED.3IONS-SCNC: 10 MMOL/L (ref 3–16)
BACTERIA UR CULT: NORMAL
BUN SERPL-MCNC: 33 MG/DL (ref 7–20)
CALCIUM SERPL-MCNC: 9 MG/DL (ref 8.3–10.6)
CHLORIDE SERPL-SCNC: 109 MMOL/L (ref 99–110)
CO2 SERPL-SCNC: 21 MMOL/L (ref 21–32)
CREAT SERPL-MCNC: 1.5 MG/DL (ref 0.8–1.3)
GFR SERPLBLD CREATININE-BSD FMLA CKD-EPI: 46 ML/MIN/{1.73_M2}
GLUCOSE BLD-MCNC: 125 MG/DL (ref 70–99)
GLUCOSE BLD-MCNC: 157 MG/DL (ref 70–99)
GLUCOSE SERPL-MCNC: 119 MG/DL (ref 70–99)
PERFORMED ON: ABNORMAL
PERFORMED ON: ABNORMAL
POTASSIUM SERPL-SCNC: 4.1 MMOL/L (ref 3.5–5.1)
SODIUM SERPL-SCNC: 140 MMOL/L (ref 136–145)

## 2025-08-15 PROCEDURE — 2500000003 HC RX 250 WO HCPCS: Performed by: NURSE PRACTITIONER

## 2025-08-15 PROCEDURE — 6360000002 HC RX W HCPCS: Performed by: NURSE PRACTITIONER

## 2025-08-15 PROCEDURE — 6370000000 HC RX 637 (ALT 250 FOR IP): Performed by: INTERNAL MEDICINE

## 2025-08-15 PROCEDURE — 1200000000 HC SEMI PRIVATE

## 2025-08-15 PROCEDURE — 6370000000 HC RX 637 (ALT 250 FOR IP): Performed by: NURSE PRACTITIONER

## 2025-08-15 PROCEDURE — 2580000003 HC RX 258: Performed by: NURSE PRACTITIONER

## 2025-08-15 PROCEDURE — 2580000003 HC RX 258: Performed by: INTERNAL MEDICINE

## 2025-08-15 PROCEDURE — 97530 THERAPEUTIC ACTIVITIES: CPT

## 2025-08-15 PROCEDURE — 97110 THERAPEUTIC EXERCISES: CPT

## 2025-08-15 PROCEDURE — 83036 HEMOGLOBIN GLYCOSYLATED A1C: CPT

## 2025-08-15 PROCEDURE — 80048 BASIC METABOLIC PNL TOTAL CA: CPT

## 2025-08-15 PROCEDURE — 36415 COLL VENOUS BLD VENIPUNCTURE: CPT

## 2025-08-15 RX ORDER — METHOCARBAMOL 750 MG/1
750 TABLET, FILM COATED ORAL 3 TIMES DAILY PRN
Status: DISCONTINUED | OUTPATIENT
Start: 2025-08-15 | End: 2025-08-17 | Stop reason: HOSPADM

## 2025-08-15 RX ORDER — SODIUM CHLORIDE 9 MG/ML
INJECTION, SOLUTION INTRAVENOUS CONTINUOUS
Status: DISCONTINUED | OUTPATIENT
Start: 2025-08-15 | End: 2025-08-17

## 2025-08-15 RX ORDER — 0.9 % SODIUM CHLORIDE 0.9 %
250 INTRAVENOUS SOLUTION INTRAVENOUS ONCE
Status: COMPLETED | OUTPATIENT
Start: 2025-08-15 | End: 2025-08-15

## 2025-08-15 RX ADMIN — GABAPENTIN 300 MG: 300 CAPSULE ORAL at 19:32

## 2025-08-15 RX ADMIN — ATORVASTATIN CALCIUM 80 MG: 80 TABLET, FILM COATED ORAL at 19:32

## 2025-08-15 RX ADMIN — SODIUM CHLORIDE 250 ML: 0.9 INJECTION, SOLUTION INTRAVENOUS at 12:52

## 2025-08-15 RX ADMIN — CEFTRIAXONE SODIUM 1000 MG: 1 INJECTION, POWDER, FOR SOLUTION INTRAMUSCULAR; INTRAVENOUS at 00:18

## 2025-08-15 RX ADMIN — APIXABAN 2.5 MG: 2.5 TABLET, FILM COATED ORAL at 07:58

## 2025-08-15 RX ADMIN — TRAZODONE HYDROCHLORIDE 50 MG: 50 TABLET ORAL at 19:32

## 2025-08-15 RX ADMIN — ACETAMINOPHEN 650 MG: 325 TABLET ORAL at 17:48

## 2025-08-15 RX ADMIN — ACETAMINOPHEN 650 MG: 325 TABLET ORAL at 07:58

## 2025-08-15 RX ADMIN — SODIUM CHLORIDE: 0.9 INJECTION, SOLUTION INTRAVENOUS at 12:45

## 2025-08-15 RX ADMIN — APIXABAN 2.5 MG: 2.5 TABLET, FILM COATED ORAL at 19:32

## 2025-08-15 RX ADMIN — SODIUM CHLORIDE, PRESERVATIVE FREE 10 ML: 5 INJECTION INTRAVENOUS at 07:58

## 2025-08-15 RX ADMIN — METHOCARBAMOL 750 MG: 750 TABLET ORAL at 18:50

## 2025-08-15 ASSESSMENT — PAIN SCALES - GENERAL
PAINLEVEL_OUTOF10: 1
PAINLEVEL_OUTOF10: 0
PAINLEVEL_OUTOF10: 3
PAINLEVEL_OUTOF10: 2
PAINLEVEL_OUTOF10: 0
PAINLEVEL_OUTOF10: 0

## 2025-08-15 ASSESSMENT — PAIN DESCRIPTION - LOCATION
LOCATION: NECK
LOCATION: NECK
LOCATION: HEAD

## 2025-08-15 ASSESSMENT — PAIN DESCRIPTION - ORIENTATION
ORIENTATION: RIGHT;LEFT
ORIENTATION: RIGHT;LEFT

## 2025-08-15 ASSESSMENT — PAIN DESCRIPTION - DESCRIPTORS
DESCRIPTORS: ACHING

## 2025-08-15 ASSESSMENT — PAIN - FUNCTIONAL ASSESSMENT
PAIN_FUNCTIONAL_ASSESSMENT: 0-10
PAIN_FUNCTIONAL_ASSESSMENT: ACTIVITIES ARE NOT PREVENTED

## 2025-08-15 ASSESSMENT — PAIN DESCRIPTION - PAIN TYPE: TYPE: ACUTE PAIN

## 2025-08-16 LAB
ANION GAP SERPL CALCULATED.3IONS-SCNC: 7 MMOL/L (ref 3–16)
BUN SERPL-MCNC: 21 MG/DL (ref 7–20)
CALCIUM SERPL-MCNC: 9.1 MG/DL (ref 8.3–10.6)
CHLORIDE SERPL-SCNC: 112 MMOL/L (ref 99–110)
CO2 SERPL-SCNC: 23 MMOL/L (ref 21–32)
CREAT SERPL-MCNC: 1.2 MG/DL (ref 0.8–1.3)
EST. AVERAGE GLUCOSE BLD GHB EST-MCNC: 165.7 MG/DL
GFR SERPLBLD CREATININE-BSD FMLA CKD-EPI: 61 ML/MIN/{1.73_M2}
GLUCOSE BLD-MCNC: 110 MG/DL (ref 70–99)
GLUCOSE BLD-MCNC: 228 MG/DL (ref 70–99)
GLUCOSE BLD-MCNC: 250 MG/DL (ref 70–99)
GLUCOSE BLD-MCNC: 94 MG/DL (ref 70–99)
GLUCOSE SERPL-MCNC: 123 MG/DL (ref 70–99)
HBA1C MFR BLD: 7.4 %
PERFORMED ON: ABNORMAL
PERFORMED ON: NORMAL
POTASSIUM SERPL-SCNC: 4.4 MMOL/L (ref 3.5–5.1)
SODIUM SERPL-SCNC: 142 MMOL/L (ref 136–145)

## 2025-08-16 PROCEDURE — 6360000002 HC RX W HCPCS: Performed by: NURSE PRACTITIONER

## 2025-08-16 PROCEDURE — 1200000000 HC SEMI PRIVATE

## 2025-08-16 PROCEDURE — 2580000003 HC RX 258: Performed by: NURSE PRACTITIONER

## 2025-08-16 PROCEDURE — 6370000000 HC RX 637 (ALT 250 FOR IP): Performed by: NURSE PRACTITIONER

## 2025-08-16 PROCEDURE — 97116 GAIT TRAINING THERAPY: CPT

## 2025-08-16 PROCEDURE — 97530 THERAPEUTIC ACTIVITIES: CPT

## 2025-08-16 PROCEDURE — 80048 BASIC METABOLIC PNL TOTAL CA: CPT

## 2025-08-16 PROCEDURE — 6370000000 HC RX 637 (ALT 250 FOR IP): Performed by: INTERNAL MEDICINE

## 2025-08-16 PROCEDURE — 2580000003 HC RX 258: Performed by: INTERNAL MEDICINE

## 2025-08-16 PROCEDURE — 36415 COLL VENOUS BLD VENIPUNCTURE: CPT

## 2025-08-16 RX ORDER — GLUCAGON 1 MG/ML
1 KIT INJECTION PRN
Status: DISCONTINUED | OUTPATIENT
Start: 2025-08-16 | End: 2025-08-17 | Stop reason: HOSPADM

## 2025-08-16 RX ORDER — DEXTROSE MONOHYDRATE 100 MG/ML
INJECTION, SOLUTION INTRAVENOUS CONTINUOUS PRN
Status: DISCONTINUED | OUTPATIENT
Start: 2025-08-16 | End: 2025-08-17 | Stop reason: HOSPADM

## 2025-08-16 RX ORDER — INSULIN LISPRO 100 [IU]/ML
0-8 INJECTION, SOLUTION INTRAVENOUS; SUBCUTANEOUS
Status: DISCONTINUED | OUTPATIENT
Start: 2025-08-16 | End: 2025-08-17 | Stop reason: HOSPADM

## 2025-08-16 RX ORDER — SENNA AND DOCUSATE SODIUM 50; 8.6 MG/1; MG/1
2 TABLET, FILM COATED ORAL DAILY
Status: DISCONTINUED | OUTPATIENT
Start: 2025-08-16 | End: 2025-08-17 | Stop reason: HOSPADM

## 2025-08-16 RX ORDER — BISACODYL 10 MG
10 SUPPOSITORY, RECTAL RECTAL ONCE
Status: COMPLETED | OUTPATIENT
Start: 2025-08-16 | End: 2025-08-16

## 2025-08-16 RX ADMIN — CEFTRIAXONE SODIUM 1000 MG: 1 INJECTION, POWDER, FOR SOLUTION INTRAMUSCULAR; INTRAVENOUS at 00:20

## 2025-08-16 RX ADMIN — APIXABAN 2.5 MG: 2.5 TABLET, FILM COATED ORAL at 10:00

## 2025-08-16 RX ADMIN — INSULIN LISPRO 2 UNITS: 100 INJECTION, SOLUTION INTRAVENOUS; SUBCUTANEOUS at 21:42

## 2025-08-16 RX ADMIN — GABAPENTIN 300 MG: 300 CAPSULE ORAL at 21:42

## 2025-08-16 RX ADMIN — DOCUSATE SODIUM 50MG AND SENNOSIDES 8.6MG 2 TABLET: 8.6; 5 TABLET, FILM COATED ORAL at 15:03

## 2025-08-16 RX ADMIN — TRAZODONE HYDROCHLORIDE 50 MG: 50 TABLET ORAL at 21:42

## 2025-08-16 RX ADMIN — ATORVASTATIN CALCIUM 80 MG: 80 TABLET, FILM COATED ORAL at 21:42

## 2025-08-16 RX ADMIN — APIXABAN 2.5 MG: 2.5 TABLET, FILM COATED ORAL at 21:42

## 2025-08-16 RX ADMIN — SODIUM CHLORIDE: 0.9 INJECTION, SOLUTION INTRAVENOUS at 11:02

## 2025-08-16 RX ADMIN — SODIUM CHLORIDE: 0.9 INJECTION, SOLUTION INTRAVENOUS at 21:44

## 2025-08-16 RX ADMIN — BISACODYL 10 MG: 10 SUPPOSITORY RECTAL at 15:03

## 2025-08-16 ASSESSMENT — PAIN DESCRIPTION - ORIENTATION: ORIENTATION: LEFT

## 2025-08-16 ASSESSMENT — PAIN DESCRIPTION - DESCRIPTORS: DESCRIPTORS: ACHING;DISCOMFORT

## 2025-08-16 ASSESSMENT — PAIN DESCRIPTION - ONSET: ONSET: ON-GOING

## 2025-08-16 ASSESSMENT — PAIN SCALES - GENERAL: PAINLEVEL_OUTOF10: 8

## 2025-08-16 ASSESSMENT — PAIN DESCRIPTION - PAIN TYPE: TYPE: ACUTE PAIN

## 2025-08-16 ASSESSMENT — PAIN DESCRIPTION - LOCATION: LOCATION: NECK

## 2025-08-16 ASSESSMENT — PAIN DESCRIPTION - FREQUENCY: FREQUENCY: CONTINUOUS

## 2025-08-17 VITALS
HEART RATE: 82 BPM | WEIGHT: 162.7 LBS | TEMPERATURE: 97.7 F | DIASTOLIC BLOOD PRESSURE: 49 MMHG | OXYGEN SATURATION: 97 % | SYSTOLIC BLOOD PRESSURE: 108 MMHG | RESPIRATION RATE: 17 BRPM | HEIGHT: 69 IN | BODY MASS INDEX: 24.1 KG/M2

## 2025-08-17 LAB
ANION GAP SERPL CALCULATED.3IONS-SCNC: 11 MMOL/L (ref 3–16)
BUN SERPL-MCNC: 13 MG/DL (ref 7–20)
CALCIUM SERPL-MCNC: 8.9 MG/DL (ref 8.3–10.6)
CHLORIDE SERPL-SCNC: 108 MMOL/L (ref 99–110)
CO2 SERPL-SCNC: 22 MMOL/L (ref 21–32)
CREAT SERPL-MCNC: 1 MG/DL (ref 0.8–1.3)
DEPRECATED RDW RBC AUTO: 15.9 % (ref 12.4–15.4)
GFR SERPLBLD CREATININE-BSD FMLA CKD-EPI: 75 ML/MIN/{1.73_M2}
GLUCOSE BLD-MCNC: 110 MG/DL (ref 70–99)
GLUCOSE BLD-MCNC: 234 MG/DL (ref 70–99)
GLUCOSE SERPL-MCNC: 121 MG/DL (ref 70–99)
HCT VFR BLD AUTO: 44.2 % (ref 40.5–52.5)
HGB BLD-MCNC: 14.1 G/DL (ref 13.5–17.5)
MCH RBC QN AUTO: 28.7 PG (ref 26–34)
MCHC RBC AUTO-ENTMCNC: 31.9 G/DL (ref 31–36)
MCV RBC AUTO: 90 FL (ref 80–100)
PERFORMED ON: ABNORMAL
PERFORMED ON: ABNORMAL
PLATELET # BLD AUTO: 216 K/UL (ref 135–450)
PMV BLD AUTO: 8.3 FL (ref 5–10.5)
POTASSIUM SERPL-SCNC: 4 MMOL/L (ref 3.5–5.1)
RBC # BLD AUTO: 4.91 M/UL (ref 4.2–5.9)
SODIUM SERPL-SCNC: 141 MMOL/L (ref 136–145)
WBC # BLD AUTO: 8.9 K/UL (ref 4–11)

## 2025-08-17 PROCEDURE — 2500000003 HC RX 250 WO HCPCS: Performed by: NURSE PRACTITIONER

## 2025-08-17 PROCEDURE — 36415 COLL VENOUS BLD VENIPUNCTURE: CPT

## 2025-08-17 PROCEDURE — 80048 BASIC METABOLIC PNL TOTAL CA: CPT

## 2025-08-17 PROCEDURE — 6360000002 HC RX W HCPCS: Performed by: NURSE PRACTITIONER

## 2025-08-17 PROCEDURE — 2580000003 HC RX 258: Performed by: NURSE PRACTITIONER

## 2025-08-17 PROCEDURE — 83036 HEMOGLOBIN GLYCOSYLATED A1C: CPT

## 2025-08-17 PROCEDURE — 6370000000 HC RX 637 (ALT 250 FOR IP): Performed by: INTERNAL MEDICINE

## 2025-08-17 PROCEDURE — 85027 COMPLETE CBC AUTOMATED: CPT

## 2025-08-17 PROCEDURE — 6370000000 HC RX 637 (ALT 250 FOR IP): Performed by: NURSE PRACTITIONER

## 2025-08-17 RX ORDER — VALSARTAN 160 MG/1
160 TABLET ORAL DAILY
Status: ON HOLD | COMMUNITY
Start: 2025-03-27 | End: 2025-08-17

## 2025-08-17 RX ORDER — MIRABEGRON 25 MG/1
25 TABLET, FILM COATED, EXTENDED RELEASE ORAL DAILY
COMMUNITY
Start: 2025-04-30

## 2025-08-17 RX ORDER — CAPSAICIN 0.07 G/100G
CREAM TOPICAL 3 TIMES DAILY PRN
COMMUNITY
Start: 2025-07-23

## 2025-08-17 RX ORDER — DULOXETIN HYDROCHLORIDE 20 MG/1
20 CAPSULE, DELAYED RELEASE ORAL DAILY
COMMUNITY

## 2025-08-17 RX ORDER — TRAZODONE HYDROCHLORIDE 50 MG/1
25 TABLET ORAL NIGHTLY
COMMUNITY
Start: 2025-03-27

## 2025-08-17 RX ORDER — CEFDINIR 300 MG/1
300 CAPSULE ORAL 2 TIMES DAILY
Qty: 6 CAPSULE | Refills: 0 | Status: SHIPPED | OUTPATIENT
Start: 2025-08-17 | End: 2025-08-20

## 2025-08-17 RX ORDER — VALSARTAN 160 MG/1
80 TABLET ORAL DAILY
Qty: 30 TABLET | Refills: 3 | Status: SHIPPED | OUTPATIENT
Start: 2025-08-17

## 2025-08-17 RX ORDER — GLIPIZIDE 5 MG/1
2.5 TABLET ORAL DAILY
COMMUNITY
Start: 2025-03-27

## 2025-08-17 RX ADMIN — SODIUM CHLORIDE, PRESERVATIVE FREE 10 ML: 5 INJECTION INTRAVENOUS at 09:47

## 2025-08-17 RX ADMIN — DOCUSATE SODIUM 50MG AND SENNOSIDES 8.6MG 2 TABLET: 8.6; 5 TABLET, FILM COATED ORAL at 09:47

## 2025-08-17 RX ADMIN — INSULIN LISPRO 2 UNITS: 100 INJECTION, SOLUTION INTRAVENOUS; SUBCUTANEOUS at 13:27

## 2025-08-17 RX ADMIN — APIXABAN 2.5 MG: 2.5 TABLET, FILM COATED ORAL at 09:47

## 2025-08-17 RX ADMIN — CEFTRIAXONE SODIUM 1000 MG: 1 INJECTION, POWDER, FOR SOLUTION INTRAMUSCULAR; INTRAVENOUS at 00:01

## 2025-08-17 ASSESSMENT — PAIN DESCRIPTION - LOCATION: LOCATION: NECK

## 2025-08-17 ASSESSMENT — PAIN DESCRIPTION - DESCRIPTORS: DESCRIPTORS: ACHING;DISCOMFORT

## 2025-08-17 ASSESSMENT — PAIN DESCRIPTION - ORIENTATION: ORIENTATION: LEFT

## 2025-08-17 ASSESSMENT — PAIN DESCRIPTION - ONSET: ONSET: ON-GOING

## 2025-08-17 ASSESSMENT — PAIN DESCRIPTION - FREQUENCY: FREQUENCY: CONTINUOUS

## 2025-08-17 ASSESSMENT — PAIN SCALES - GENERAL: PAINLEVEL_OUTOF10: 8

## 2025-08-17 ASSESSMENT — PAIN DESCRIPTION - PAIN TYPE: TYPE: ACUTE PAIN

## 2025-08-18 LAB
EST. AVERAGE GLUCOSE BLD GHB EST-MCNC: 165.7 MG/DL
HBA1C MFR BLD: 7.4 %

## (undated) DEVICE — UNIVERSAL BLOCK TRAY: Brand: MEDLINE INDUSTRIES, INC.

## (undated) DEVICE — SUTURE VCRL SZ 0 L27IN ABSRB UD L26MM CT-2 1/2 CIR J270H

## (undated) DEVICE — KIT JACK TBL PT CARE

## (undated) DEVICE — TOOL 14MH30 LEGEND 14CM 3MM: Brand: MIDAS REX ™

## (undated) DEVICE — ALCOHOL RUBBING 16OZ 70% ISO

## (undated) DEVICE — COVER,MAYO STAND,XL,STERILE: Brand: MEDLINE

## (undated) DEVICE — Device

## (undated) DEVICE — TOWEL OR BLUEE 16X26IN ST 8 PACK ORB08 16X26ORTWL

## (undated) DEVICE — SUTURE MCRYL + SZ 4-0 L18IN ABSRB UD L19MM PS-2 3/8 CIR MCP496G

## (undated) DEVICE — GLOVE ORANGE PI 7 1/2   MSG9075

## (undated) DEVICE — GAUZE,SPONGE,4"X4",16PLY,STRL,LF,10/TRAY: Brand: MEDLINE

## (undated) DEVICE — SOLUTION IV IRRIG POUR BRL 0.9% SODIUM CHL 2F7124

## (undated) DEVICE — CRADLE POS PRONE 24 X 5 X 3 IN ARM N COMPR NO CVR FOAM DISP

## (undated) DEVICE — STERILE POLYISOPRENE POWDER-FREE SURGICAL GLOVES: Brand: PROTEXIS

## (undated) DEVICE — SUTURE VCRL + SZ 3-0 L18IN ABSRB UD SH 1/2 CIR TAPERCUT NDL VCP864D

## (undated) DEVICE — APPLICATOR PREP 26ML 0.7% IOD POVACRYLEX 74% ISO ALC ST